# Patient Record
Sex: FEMALE | Race: ASIAN | NOT HISPANIC OR LATINO | Employment: FULL TIME | URBAN - METROPOLITAN AREA
[De-identification: names, ages, dates, MRNs, and addresses within clinical notes are randomized per-mention and may not be internally consistent; named-entity substitution may affect disease eponyms.]

---

## 2019-05-25 ENCOUNTER — AMBULATORY - HEALTHEAST (OUTPATIENT)
Dept: LAB | Facility: CLINIC | Age: 24
End: 2019-05-25

## 2019-05-25 DIAGNOSIS — O00.90 ECTOPIC PREGNANCY: ICD-10-CM

## 2019-05-25 LAB — HCG SERPL-ACNC: 380 MLU/ML (ref 0–4)

## 2019-05-27 ENCOUNTER — AMBULATORY - HEALTHEAST (OUTPATIENT)
Dept: LAB | Facility: CLINIC | Age: 24
End: 2019-05-27

## 2019-05-27 DIAGNOSIS — O00.90 ECTOPIC PREGNANCY: ICD-10-CM

## 2019-05-30 ENCOUNTER — RECORDS - HEALTHEAST (OUTPATIENT)
Dept: ADMINISTRATIVE | Facility: OTHER | Age: 24
End: 2019-05-30

## 2019-06-04 ENCOUNTER — ANESTHESIA - HEALTHEAST (OUTPATIENT)
Dept: SURGERY | Facility: HOSPITAL | Age: 24
End: 2019-06-04

## 2019-06-04 ASSESSMENT — MIFFLIN-ST. JEOR: SCORE: 1282.46

## 2019-06-05 ENCOUNTER — SURGERY - HEALTHEAST (OUTPATIENT)
Dept: SURGERY | Facility: HOSPITAL | Age: 24
End: 2019-06-05

## 2021-02-26 ENCOUNTER — ANESTHESIA - HEALTHEAST (OUTPATIENT)
Dept: OBGYN | Facility: CLINIC | Age: 26
End: 2021-02-26

## 2021-03-01 ENCOUNTER — COMMUNICATION - HEALTHEAST (OUTPATIENT)
Dept: NURSING | Facility: CLINIC | Age: 26
End: 2021-03-01

## 2021-03-01 ENCOUNTER — AMBULATORY - HEALTHEAST (OUTPATIENT)
Dept: NURSING | Facility: CLINIC | Age: 26
End: 2021-03-01

## 2021-03-01 DIAGNOSIS — U07.1 2019 NOVEL CORONAVIRUS DISEASE (COVID-19): ICD-10-CM

## 2021-05-29 NOTE — ANESTHESIA POSTPROCEDURE EVALUATION
Patient: Bryson Cornelius  LAPAROSCOPY WITH Right Salpingectomy, removal of ectopic preganancy, Dilation and Curettage, DILATION AND CURETTAGE  Anesthesia type: general    Patient location: Phase II Recovery  Last vitals:   Vitals Value Taken Time   /58 6/5/2019  5:00 PM   Temp 36.8  C (98.3  F) 6/5/2019  4:06 PM   Pulse 72 6/5/2019  5:03 PM   Resp 20 6/5/2019  5:00 PM   SpO2 98 % 6/5/2019  5:03 PM   Vitals shown include unvalidated device data.  Post vital signs: stable  Level of consciousness: awake and responds to simple questions  Post-anesthesia pain: pain controlled  Post-anesthesia nausea and vomiting: no  Pulmonary: unassisted, return to baseline  Cardiovascular: stable and blood pressure at baseline  Hydration: adequate  Anesthetic events: no    QCDR Measures:  ASA# 11 - Marisol-op Cardiac Arrest: ASA11B - Patient did NOT experience unanticipated cardiac arrest  ASA# 12 - Marisol-op Mortality Rate: ASA12B - Patient did NOT die  ASA# 13 - PACU Re-Intubation Rate: ASA13B - Patient did NOT require a new airway mgmt  ASA# 10 - Composite Anes Safety: ASA10A - No serious adverse event    Additional Notes:

## 2021-05-29 NOTE — ANESTHESIA PREPROCEDURE EVALUATION
Anesthesia Evaluation      Patient summary reviewed   No history of anesthetic complications     Airway   Mallampati: I  Neck ROM: full   Pulmonary - normal exam   (+) a smoker (former)                         Cardiovascular - negative ROS and normal exam  Exercise tolerance: > or = 4 METS   Neuro/Psych - negative ROS     Endo/Other       Comments: Ectopic pregnancy    GI/Hepatic/Renal - negative ROS           Dental - normal exam                        Anesthesia Plan  Planned anesthetic: general endotracheal and total IV anesthesia  Modified RSI  TIVA  Decadron  Zofran  ASA 2   Induction: intravenous   Anesthetic plan and risks discussed with: patient  Anesthesia plan special considerations: antiemetics,   Post-op plan: routine recovery

## 2021-05-29 NOTE — ANESTHESIA CARE TRANSFER NOTE
Last vitals:   Vitals:    06/05/19 1535   BP: 108/60   Pulse: 84   Resp:    Temp: 37.4  C (99.4  F)   SpO2: 100%     Patient's level of consciousness is drowsy  Spontaneous respirations: yes  Maintains airway independently: yes  Dentition unchanged: yes  Oropharynx: oropharynx clear of all foreign objects    QCDR Measures:  ASA# 20 - Surgical Safety Checklist: WHO surgical safety checklist completed prior to induction    PQRS# 430 - Adult PONV Prevention: 4558F - Pt received => 2 anti-emetic agents (different classes) preop & intraop  ASA# 8 - Peds PONV Prevention: NA - Not pediatric patient, not GA or 2 or more risk factors NOT present  PQRS# 424 - Marisol-op Temp Management: 4559F - At least one body temp DOCUMENTED => 35.5C or 95.9F within required timeframe  PQRS# 426 - PACU Transfer Protocol: - Transfer of care checklist used  ASA# 14 - Acute Post-op Pain: ASA14B - Patient did NOT experience pain >= 7 out of 10

## 2021-06-03 VITALS — BODY MASS INDEX: 27.09 KG/M2 | WEIGHT: 138 LBS | HEIGHT: 60 IN

## 2021-06-15 NOTE — ANESTHESIA PROCEDURE NOTES
Epidural Block    Patient location during procedure: OB  Time Called: 2/26/2021 3:48 PM  Reason for Block:labor epidural  Staffing:  Performing  Anesthesiologist: Nivia Albert MD  Preanesthetic Checklist  Completed: patient identified, risks, benefits, and alternatives discussed, timeout performed, consent obtained, at patient's request, airway assessed, oxygen available, suction available, emergency drugs available and hand hygiene performed  Procedure  Patient position: sitting  Prep: ChloraPrep and site prepped and draped  Patient monitoring: continuous pulse oximetry, heart rate and blood pressure  Approach: midline  Location: L1-L2  Injection technique: MARY saline  Number of Attempts:1  Needle  Needle type: Fabrice   Needle gauge: 18 G     Catheter in Space: 3  Assessment  Sensory level:  No complications

## 2021-06-15 NOTE — ANESTHESIA POSTPROCEDURE EVALUATION
Patient: Bryson Cornelius  * No procedures listed *  Anesthesia type: epidural    Patient location: Labor and Delivery  Last vitals: No vitals data found for the desired time range.    Post vital signs: stable  Level of consciousness: awake and return to baseline  Post-anesthesia pain: pain controlled  Post-anesthesia nausea and vomiting: no  Pulmonary: unassisted, return to baseline  Cardiovascular: stable and blood pressure at baseline  Hydration: adequate  Anesthetic events: no, epidural resolved    QCDR Measures:  ASA# 11 - Marisol-op Cardiac Arrest: ASA11B - Patient did NOT experience unanticipated cardiac arrest  ASA# 12 - Marisol-op Mortality Rate: ASA12B - Patient did NOT die  ASA# 13 - PACU Re-Intubation Rate: NA - No ETT / LMA used for case  ASA# 10 - Composite Anes Safety: ASA10A - No serious adverse event    Additional Notes:

## 2021-06-16 PROBLEM — Z34.90 PREGNANT: Status: ACTIVE | Noted: 2021-02-26

## 2021-06-27 ENCOUNTER — HEALTH MAINTENANCE LETTER (OUTPATIENT)
Age: 26
End: 2021-06-27

## 2021-08-04 ENCOUNTER — APPOINTMENT (OUTPATIENT)
Dept: URGENT CARE | Facility: CLINIC | Age: 26
End: 2021-08-04
Payer: COMMERCIAL

## 2021-10-17 ENCOUNTER — HEALTH MAINTENANCE LETTER (OUTPATIENT)
Age: 26
End: 2021-10-17

## 2021-10-28 LAB
ABO (EXTERNAL): NORMAL
HEMOGLOBIN (EXTERNAL): 13.1 G/DL (ref 11.7–15.5)
HEPATITIS B SURFACE ANTIGEN (EXTERNAL): NONREACTIVE
HIV1+2 AB SERPL QL IA: NONREACTIVE
RH (EXTERNAL): POSITIVE
RUBELLA ANTIBODY IGG (EXTERNAL): POSITIVE
TREPONEMA PALLIDUM ANTIBODY (EXTERNAL): NONREACTIVE

## 2022-02-24 LAB — HEMOGLOBIN (EXTERNAL): 11.7 G/DL (ref 11.7–15.5)

## 2022-04-04 ENCOUNTER — HOSPITAL ENCOUNTER (OUTPATIENT)
Facility: CLINIC | Age: 27
Discharge: HOME | End: 2022-04-04
Attending: ADVANCED PRACTICE MIDWIFE | Admitting: ADVANCED PRACTICE MIDWIFE
Payer: COMMERCIAL

## 2022-04-04 ENCOUNTER — HOSPITAL ENCOUNTER (OUTPATIENT)
Facility: CLINIC | Age: 27
End: 2022-04-04
Admitting: ADVANCED PRACTICE MIDWIFE
Payer: COMMERCIAL

## 2022-04-04 VITALS
RESPIRATION RATE: 16 BRPM | TEMPERATURE: 98.1 F | BODY MASS INDEX: 34.95 KG/M2 | DIASTOLIC BLOOD PRESSURE: 65 MMHG | HEIGHT: 60 IN | SYSTOLIC BLOOD PRESSURE: 110 MMHG | WEIGHT: 178 LBS

## 2022-04-04 PROBLEM — Z36.89 ENCOUNTER FOR TRIAGE IN PREGNANT PATIENT: Status: ACTIVE | Noted: 2022-04-04

## 2022-04-04 PROCEDURE — G0463 HOSPITAL OUTPT CLINIC VISIT: HCPCS

## 2022-04-04 RX ORDER — LACTOBACILLUS RHAMNOSUS GG 10B CELL
1 CAPSULE ORAL 2 TIMES DAILY
Status: ON HOLD | COMMUNITY
End: 2023-10-10

## 2022-04-04 RX ORDER — LIDOCAINE 40 MG/G
CREAM TOPICAL
Status: DISCONTINUED | OUTPATIENT
Start: 2022-04-04 | End: 2022-04-04 | Stop reason: HOSPADM

## 2022-04-04 NOTE — PROVIDER NOTIFICATION
04/04/22 1657   Provider Notification   Provider Name/Title Grey TSE   Method of Notification Phone   Request Evaluate - Remote   Notification Reason Status Update   updated on pt EFM category 1 with no contractions. Discharge pt home in stable condition with pre-term labor precautions

## 2022-04-04 NOTE — PROVIDER NOTIFICATION
04/04/22 1618   Provider Notification   Provider Name/Title Grey TSE   Method of Notification Phone   Request Evaluate - Remote   Notification Reason Patient Arrived;Status Update   pt arrival to unit s/p fall around midnight, category 1 EFM at 21 minutes right now, pt reports no cramping/bleeding/leaking, active fetal movement and just some soreness. States to monitor pt for 45-60minutes from time EFM was applied and call with tracing results.

## 2022-04-04 NOTE — PLAN OF CARE
Pt Arrival to [facility] for s/p fall around midnight. EFM applied at 1556 with active fetal movement heard and reported by pt. Pt states her feet came out from under her as she slipped and she hit her buttock/back area but no sides or abdomen. Pt states no cramping, bleeding, leaking of fluid, or bruising just some soreness. Will continue to monitor and provide support, provider paged for further orders.

## 2022-04-04 NOTE — DISCHARGE INSTRUCTIONS
Premature Labor    Premature labor ( labor) is when symptoms of labor occur before 37 weeks of pregnancy. (This is 3 weeks before your due date.) Premature labor can lead to premature delivery. This means giving birth to your baby early. Babies need at least 37 weeks of pregnancy for all the organs to develop normally. The earlier the delivery, the greater the risks to the baby.  In most cases, the cause of premature labor is unknown. But certain factors may make the problem more likely. These include:    History of premature labor with other pregnancies    Smoking    Alcohol or substance abuse    Low pre-pregnancy weight or weight gain during pregnancy    Short time period between pregnancies    Being pregnant with twins, triplets, or more    History of certain types of surgery on the cervix or uterus    Having a short cervix    Certain infections  There are a number of other risk factors. Ask your healthcare provider to help you understand the risk factors specific to your case. Then find out what you can do to control or reduce them.  Contractions are one of the main signs of premature labor. A contraction is different from cramping. It may feel painful and the belly (abdomen) may get hard. It can last from a few seconds to a few minutes. Some women may feel only a sense of pressure in the belly, thighs, rectum, or vagina. Some may feel only the hardening of the uterus without pain or pressure. Or there may be a constant pain in the lower back, which spreads forward toward the belly.Premature labor is often treated with medicines. A hospital stay may be needed. If labor doesn't progress and you and your baby are both healthy, you may be discharged to continue care at home.  Home care    Ask your provider any questions you have. Be certain you understand how to care for yourself at home. Also follow all recommendations given by your healthcare providers.    Learn the signs of premature labor. Watch for  these signs when you get home.    Limit or restrict activities as advised. This may include stopping certain physical activities and cutting back hours at work.    Avoid doing strenuous work as directed by your provider. Ask family and friends for help with tasks and support at home, if needed.    Don t smoke, drink alcohol, or use other harmful substances.    Take steps to reduce stress.    Report any unusual symptoms to your provider.    Follow-up care  Follow up with your healthcare provider, or as directed. Weekly visits with your provider may be needed.  When to seek medical advice  Call your healthcare provider right away if any of these occur:    Regular or frequent contractions, whether they are painful or not    Pressure in the pelvis    Pressure in the lower belly or mild cramping in your belly with or without diarrhea    Constant low, dull backache    Gush or slow leaking of water from your vagina    Change in vaginal discharge (watery, mucus, or bloody)    Any vaginal bleeding    Decreased movement of your baby  Bernardino last reviewed this educational content on 6/1/2018 2000-2021 [employer]. All rights reserved. This information is not intended as a substitute for professional medical care. Always follow your healthcare professional's instructions.

## 2022-04-22 LAB — GROUP B STREPTOCOCCUS (EXTERNAL): NEGATIVE

## 2022-05-02 ENCOUNTER — LAB (OUTPATIENT)
Dept: LAB | Facility: CLINIC | Age: 27
End: 2022-05-02
Attending: OBSTETRICS & GYNECOLOGY
Payer: COMMERCIAL

## 2022-05-02 DIAGNOSIS — Z01.812 PRE-PROCEDURE LAB EXAM: Primary | ICD-10-CM

## 2022-05-02 DIAGNOSIS — Z01.812 PRE-PROCEDURE LAB EXAM: ICD-10-CM

## 2022-05-02 LAB — SARS-COV-2 RNA RESP QL NAA+PROBE: NEGATIVE

## 2022-05-02 PROCEDURE — U0003 INFECTIOUS AGENT DETECTION BY NUCLEIC ACID (DNA OR RNA); SEVERE ACUTE RESPIRATORY SYNDROME CORONAVIRUS 2 (SARS-COV-2) (CORONAVIRUS DISEASE [COVID-19]), AMPLIFIED PROBE TECHNIQUE, MAKING USE OF HIGH THROUGHPUT TECHNOLOGIES AS DESCRIBED BY CMS-2020-01-R: HCPCS

## 2022-05-02 PROCEDURE — U0005 INFEC AGEN DETEC AMPLI PROBE: HCPCS

## 2022-05-03 ENCOUNTER — MEDICAL CORRESPONDENCE (OUTPATIENT)
Dept: HEALTH INFORMATION MANAGEMENT | Facility: CLINIC | Age: 27
End: 2022-05-03
Payer: COMMERCIAL

## 2022-05-04 ENCOUNTER — HOSPITAL ENCOUNTER (INPATIENT)
Facility: CLINIC | Age: 27
LOS: 1 days | Discharge: HOME | End: 2022-05-05
Attending: REGISTERED NURSE | Admitting: REGISTERED NURSE
Payer: COMMERCIAL

## 2022-05-04 ENCOUNTER — ANESTHESIA (OUTPATIENT)
Dept: OBGYN | Facility: CLINIC | Age: 27
End: 2022-05-04
Payer: COMMERCIAL

## 2022-05-04 ENCOUNTER — ANESTHESIA EVENT (OUTPATIENT)
Dept: OBGYN | Facility: CLINIC | Age: 27
End: 2022-05-04
Payer: COMMERCIAL

## 2022-05-04 PROBLEM — Z34.90 ENCOUNTER FOR INDUCTION OF LABOR: Status: ACTIVE | Noted: 2022-05-04

## 2022-05-04 LAB
ABO/RH(D): NORMAL
ANTIBODY SCREEN: NEGATIVE
BASOPHILS # BLD AUTO: 0 10E3/UL (ref 0–0.2)
BASOPHILS NFR BLD AUTO: 0 %
EOSINOPHIL # BLD AUTO: 0.1 10E3/UL (ref 0–0.7)
EOSINOPHIL NFR BLD AUTO: 1 %
ERYTHROCYTE [DISTWIDTH] IN BLOOD BY AUTOMATED COUNT: 16.1 % (ref 10–15)
HCT VFR BLD AUTO: 38.1 % (ref 35–47)
HGB BLD-MCNC: 11.8 G/DL (ref 11.7–15.7)
IMM GRANULOCYTES # BLD: 0.2 10E3/UL
IMM GRANULOCYTES NFR BLD: 2 %
LYMPHOCYTES # BLD AUTO: 2 10E3/UL (ref 0.8–5.3)
LYMPHOCYTES NFR BLD AUTO: 15 %
MCH RBC QN AUTO: 26.5 PG (ref 26.5–33)
MCHC RBC AUTO-ENTMCNC: 31 G/DL (ref 31.5–36.5)
MCV RBC AUTO: 86 FL (ref 78–100)
MONOCYTES # BLD AUTO: 1 10E3/UL (ref 0–1.3)
MONOCYTES NFR BLD AUTO: 7 %
NEUTROPHILS # BLD AUTO: 10.3 10E3/UL (ref 1.6–8.3)
NEUTROPHILS NFR BLD AUTO: 75 %
NRBC # BLD AUTO: 0 10E3/UL
NRBC BLD AUTO-RTO: 0 /100
PLATELET # BLD AUTO: 239 10E3/UL (ref 150–450)
RBC # BLD AUTO: 4.45 10E6/UL (ref 3.8–5.2)
SPECIMEN EXPIRATION DATE: NORMAL
T PALLIDUM AB SER QL: NONREACTIVE
WBC # BLD AUTO: 13.6 10E3/UL (ref 4–11)

## 2022-05-04 PROCEDURE — 10907ZC DRAINAGE OF AMNIOTIC FLUID, THERAPEUTIC FROM PRODUCTS OF CONCEPTION, VIA NATURAL OR ARTIFICIAL OPENING: ICD-10-PCS | Performed by: REGISTERED NURSE

## 2022-05-04 PROCEDURE — 86780 TREPONEMA PALLIDUM: CPT | Performed by: REGISTERED NURSE

## 2022-05-04 PROCEDURE — 86850 RBC ANTIBODY SCREEN: CPT | Performed by: REGISTERED NURSE

## 2022-05-04 PROCEDURE — 250N000013 HC RX MED GY IP 250 OP 250 PS 637: Performed by: REGISTERED NURSE

## 2022-05-04 PROCEDURE — 36415 COLL VENOUS BLD VENIPUNCTURE: CPT | Performed by: REGISTERED NURSE

## 2022-05-04 PROCEDURE — 00HU33Z INSERTION OF INFUSION DEVICE INTO SPINAL CANAL, PERCUTANEOUS APPROACH: ICD-10-PCS | Performed by: ANESTHESIOLOGY

## 2022-05-04 PROCEDURE — 250N000011 HC RX IP 250 OP 636: Performed by: ANESTHESIOLOGY

## 2022-05-04 PROCEDURE — 258N000003 HC RX IP 258 OP 636: Performed by: REGISTERED NURSE

## 2022-05-04 PROCEDURE — 250N000009 HC RX 250: Performed by: REGISTERED NURSE

## 2022-05-04 PROCEDURE — 3E0R3BZ INTRODUCTION OF ANESTHETIC AGENT INTO SPINAL CANAL, PERCUTANEOUS APPROACH: ICD-10-PCS | Performed by: ANESTHESIOLOGY

## 2022-05-04 PROCEDURE — 120N000001 HC R&B MED SURG/OB

## 2022-05-04 PROCEDURE — 370N000003 HC ANESTHESIA WARD SERVICE

## 2022-05-04 PROCEDURE — 3E033VJ INTRODUCTION OF OTHER HORMONE INTO PERIPHERAL VEIN, PERCUTANEOUS APPROACH: ICD-10-PCS | Performed by: REGISTERED NURSE

## 2022-05-04 PROCEDURE — 85014 HEMATOCRIT: CPT | Performed by: REGISTERED NURSE

## 2022-05-04 PROCEDURE — 86901 BLOOD TYPING SEROLOGIC RH(D): CPT | Performed by: REGISTERED NURSE

## 2022-05-04 PROCEDURE — 722N000001 HC LABOR CARE VAGINAL DELIVERY SINGLE

## 2022-05-04 PROCEDURE — 250N000009 HC RX 250: Performed by: ANESTHESIOLOGY

## 2022-05-04 RX ORDER — FENTANYL CITRATE 50 UG/ML
100 INJECTION, SOLUTION INTRAMUSCULAR; INTRAVENOUS
Status: DISCONTINUED | OUTPATIENT
Start: 2022-05-04 | End: 2022-05-04 | Stop reason: HOSPADM

## 2022-05-04 RX ORDER — OXYCODONE HYDROCHLORIDE 5 MG/1
5 TABLET ORAL EVERY 4 HOURS PRN
Status: DISCONTINUED | OUTPATIENT
Start: 2022-05-04 | End: 2022-05-06 | Stop reason: HOSPADM

## 2022-05-04 RX ORDER — MISOPROSTOL 200 UG/1
400 TABLET ORAL
Status: DISCONTINUED | OUTPATIENT
Start: 2022-05-04 | End: 2022-05-06 | Stop reason: HOSPADM

## 2022-05-04 RX ORDER — BUPIVACAINE HYDROCHLORIDE 2.5 MG/ML
INJECTION, SOLUTION EPIDURAL; INFILTRATION; INTRACAUDAL PRN
Status: DISCONTINUED | OUTPATIENT
Start: 2022-05-04 | End: 2022-05-04

## 2022-05-04 RX ORDER — LIDOCAINE HYDROCHLORIDE AND EPINEPHRINE 15; 5 MG/ML; UG/ML
INJECTION, SOLUTION EPIDURAL PRN
Status: DISCONTINUED | OUTPATIENT
Start: 2022-05-04 | End: 2022-05-04

## 2022-05-04 RX ORDER — METOCLOPRAMIDE HYDROCHLORIDE 5 MG/ML
10 INJECTION INTRAMUSCULAR; INTRAVENOUS EVERY 6 HOURS PRN
Status: DISCONTINUED | OUTPATIENT
Start: 2022-05-04 | End: 2022-05-04 | Stop reason: HOSPADM

## 2022-05-04 RX ORDER — EPHEDRINE SULFATE 50 MG/ML
5 INJECTION, SOLUTION INTRAMUSCULAR; INTRAVENOUS; SUBCUTANEOUS
Status: DISCONTINUED | OUTPATIENT
Start: 2022-05-04 | End: 2022-05-04 | Stop reason: HOSPADM

## 2022-05-04 RX ORDER — OXYTOCIN/0.9 % SODIUM CHLORIDE 30/500 ML
340 PLASTIC BAG, INJECTION (ML) INTRAVENOUS CONTINUOUS PRN
Status: DISCONTINUED | OUTPATIENT
Start: 2022-05-04 | End: 2022-05-06 | Stop reason: HOSPADM

## 2022-05-04 RX ORDER — MISOPROSTOL 200 UG/1
400 TABLET ORAL
Status: DISCONTINUED | OUTPATIENT
Start: 2022-05-04 | End: 2022-05-04 | Stop reason: HOSPADM

## 2022-05-04 RX ORDER — CITRIC ACID/SODIUM CITRATE 334-500MG
30 SOLUTION, ORAL ORAL
Status: DISCONTINUED | OUTPATIENT
Start: 2022-05-04 | End: 2022-05-04 | Stop reason: HOSPADM

## 2022-05-04 RX ORDER — NALOXONE HYDROCHLORIDE 0.4 MG/ML
0.2 INJECTION, SOLUTION INTRAMUSCULAR; INTRAVENOUS; SUBCUTANEOUS
Status: DISCONTINUED | OUTPATIENT
Start: 2022-05-04 | End: 2022-05-06 | Stop reason: HOSPADM

## 2022-05-04 RX ORDER — MISOPROSTOL 200 UG/1
800 TABLET ORAL
Status: DISCONTINUED | OUTPATIENT
Start: 2022-05-04 | End: 2022-05-06 | Stop reason: HOSPADM

## 2022-05-04 RX ORDER — IBUPROFEN 800 MG/1
800 TABLET, FILM COATED ORAL EVERY 6 HOURS PRN
Status: DISCONTINUED | OUTPATIENT
Start: 2022-05-04 | End: 2022-05-06 | Stop reason: HOSPADM

## 2022-05-04 RX ORDER — ONDANSETRON 4 MG/1
4 TABLET, ORALLY DISINTEGRATING ORAL EVERY 6 HOURS PRN
Status: DISCONTINUED | OUTPATIENT
Start: 2022-05-04 | End: 2022-05-04 | Stop reason: HOSPADM

## 2022-05-04 RX ORDER — TERBUTALINE SULFATE 1 MG/ML
0.25 INJECTION, SOLUTION SUBCUTANEOUS
Status: DISCONTINUED | OUTPATIENT
Start: 2022-05-04 | End: 2022-05-04 | Stop reason: HOSPADM

## 2022-05-04 RX ORDER — OXYTOCIN 10 [USP'U]/ML
10 INJECTION, SOLUTION INTRAMUSCULAR; INTRAVENOUS
Status: DISCONTINUED | OUTPATIENT
Start: 2022-05-04 | End: 2022-05-04 | Stop reason: HOSPADM

## 2022-05-04 RX ORDER — METOCLOPRAMIDE 10 MG/1
10 TABLET ORAL EVERY 6 HOURS PRN
Status: DISCONTINUED | OUTPATIENT
Start: 2022-05-04 | End: 2022-05-04 | Stop reason: HOSPADM

## 2022-05-04 RX ORDER — ACETAMINOPHEN 325 MG/1
650 TABLET ORAL EVERY 4 HOURS PRN
Status: DISCONTINUED | OUTPATIENT
Start: 2022-05-04 | End: 2022-05-06 | Stop reason: HOSPADM

## 2022-05-04 RX ORDER — CARBOPROST TROMETHAMINE 250 UG/ML
250 INJECTION, SOLUTION INTRAMUSCULAR
Status: DISCONTINUED | OUTPATIENT
Start: 2022-05-04 | End: 2022-05-06 | Stop reason: HOSPADM

## 2022-05-04 RX ORDER — HYDROCORTISONE 2.5 %
CREAM (GRAM) TOPICAL 3 TIMES DAILY PRN
Status: DISCONTINUED | OUTPATIENT
Start: 2022-05-04 | End: 2022-05-06 | Stop reason: HOSPADM

## 2022-05-04 RX ORDER — OXYTOCIN/0.9 % SODIUM CHLORIDE 30/500 ML
340 PLASTIC BAG, INJECTION (ML) INTRAVENOUS CONTINUOUS PRN
Status: DISCONTINUED | OUTPATIENT
Start: 2022-05-04 | End: 2022-05-04 | Stop reason: HOSPADM

## 2022-05-04 RX ORDER — NALOXONE HYDROCHLORIDE 0.4 MG/ML
0.4 INJECTION, SOLUTION INTRAMUSCULAR; INTRAVENOUS; SUBCUTANEOUS
Status: DISCONTINUED | OUTPATIENT
Start: 2022-05-04 | End: 2022-05-06 | Stop reason: HOSPADM

## 2022-05-04 RX ORDER — MODIFIED LANOLIN
OINTMENT (GRAM) TOPICAL
Status: DISCONTINUED | OUTPATIENT
Start: 2022-05-04 | End: 2022-05-06 | Stop reason: HOSPADM

## 2022-05-04 RX ORDER — PROCHLORPERAZINE MALEATE 10 MG
10 TABLET ORAL EVERY 6 HOURS PRN
Status: DISCONTINUED | OUTPATIENT
Start: 2022-05-04 | End: 2022-05-04 | Stop reason: HOSPADM

## 2022-05-04 RX ORDER — OXYTOCIN 10 [USP'U]/ML
10 INJECTION, SOLUTION INTRAMUSCULAR; INTRAVENOUS
Status: DISCONTINUED | OUTPATIENT
Start: 2022-05-04 | End: 2022-05-06 | Stop reason: HOSPADM

## 2022-05-04 RX ORDER — MISOPROSTOL 200 UG/1
800 TABLET ORAL
Status: DISCONTINUED | OUTPATIENT
Start: 2022-05-04 | End: 2022-05-04 | Stop reason: HOSPADM

## 2022-05-04 RX ORDER — ONDANSETRON 2 MG/ML
4 INJECTION INTRAMUSCULAR; INTRAVENOUS EVERY 6 HOURS PRN
Status: DISCONTINUED | OUTPATIENT
Start: 2022-05-04 | End: 2022-05-04 | Stop reason: HOSPADM

## 2022-05-04 RX ORDER — LIDOCAINE 40 MG/G
CREAM TOPICAL
Status: DISCONTINUED | OUTPATIENT
Start: 2022-05-04 | End: 2022-05-04 | Stop reason: HOSPADM

## 2022-05-04 RX ORDER — NALBUPHINE HYDROCHLORIDE 10 MG/ML
2.5-5 INJECTION, SOLUTION INTRAMUSCULAR; INTRAVENOUS; SUBCUTANEOUS EVERY 6 HOURS PRN
Status: DISCONTINUED | OUTPATIENT
Start: 2022-05-04 | End: 2022-05-06 | Stop reason: HOSPADM

## 2022-05-04 RX ORDER — METHYLERGONOVINE MALEATE 0.2 MG/ML
200 INJECTION INTRAVENOUS
Status: DISCONTINUED | OUTPATIENT
Start: 2022-05-04 | End: 2022-05-06 | Stop reason: HOSPADM

## 2022-05-04 RX ORDER — SODIUM CHLORIDE, SODIUM LACTATE, POTASSIUM CHLORIDE, CALCIUM CHLORIDE 600; 310; 30; 20 MG/100ML; MG/100ML; MG/100ML; MG/100ML
INJECTION, SOLUTION INTRAVENOUS CONTINUOUS PRN
Status: DISCONTINUED | OUTPATIENT
Start: 2022-05-04 | End: 2022-05-04 | Stop reason: HOSPADM

## 2022-05-04 RX ORDER — NALOXONE HYDROCHLORIDE 0.4 MG/ML
0.2 INJECTION, SOLUTION INTRAMUSCULAR; INTRAVENOUS; SUBCUTANEOUS
Status: DISCONTINUED | OUTPATIENT
Start: 2022-05-04 | End: 2022-05-04 | Stop reason: HOSPADM

## 2022-05-04 RX ORDER — OXYTOCIN/0.9 % SODIUM CHLORIDE 30/500 ML
100-340 PLASTIC BAG, INJECTION (ML) INTRAVENOUS CONTINUOUS PRN
Status: DISCONTINUED | OUTPATIENT
Start: 2022-05-04 | End: 2022-05-06 | Stop reason: HOSPADM

## 2022-05-04 RX ORDER — BISACODYL 10 MG
10 SUPPOSITORY, RECTAL RECTAL DAILY PRN
Status: DISCONTINUED | OUTPATIENT
Start: 2022-05-04 | End: 2022-05-06 | Stop reason: HOSPADM

## 2022-05-04 RX ORDER — CARBOPROST TROMETHAMINE 250 UG/ML
250 INJECTION, SOLUTION INTRAMUSCULAR
Status: DISCONTINUED | OUTPATIENT
Start: 2022-05-04 | End: 2022-05-04 | Stop reason: HOSPADM

## 2022-05-04 RX ORDER — OXYTOCIN/0.9 % SODIUM CHLORIDE 30/500 ML
1-24 PLASTIC BAG, INJECTION (ML) INTRAVENOUS CONTINUOUS
Status: DISCONTINUED | OUTPATIENT
Start: 2022-05-04 | End: 2022-05-04 | Stop reason: HOSPADM

## 2022-05-04 RX ORDER — PROCHLORPERAZINE 25 MG
25 SUPPOSITORY, RECTAL RECTAL EVERY 12 HOURS PRN
Status: DISCONTINUED | OUTPATIENT
Start: 2022-05-04 | End: 2022-05-04 | Stop reason: HOSPADM

## 2022-05-04 RX ORDER — DOCUSATE SODIUM 100 MG/1
100 CAPSULE, LIQUID FILLED ORAL DAILY
Status: DISCONTINUED | OUTPATIENT
Start: 2022-05-04 | End: 2022-05-06 | Stop reason: HOSPADM

## 2022-05-04 RX ORDER — METHYLERGONOVINE MALEATE 0.2 MG/ML
200 INJECTION INTRAVENOUS
Status: DISCONTINUED | OUTPATIENT
Start: 2022-05-04 | End: 2022-05-04 | Stop reason: HOSPADM

## 2022-05-04 RX ORDER — IBUPROFEN 800 MG/1
800 TABLET, FILM COATED ORAL
Status: DISCONTINUED | OUTPATIENT
Start: 2022-05-04 | End: 2022-05-06 | Stop reason: HOSPADM

## 2022-05-04 RX ORDER — NALOXONE HYDROCHLORIDE 0.4 MG/ML
0.4 INJECTION, SOLUTION INTRAMUSCULAR; INTRAVENOUS; SUBCUTANEOUS
Status: DISCONTINUED | OUTPATIENT
Start: 2022-05-04 | End: 2022-05-04 | Stop reason: HOSPADM

## 2022-05-04 RX ORDER — KETOROLAC TROMETHAMINE 30 MG/ML
30 INJECTION, SOLUTION INTRAMUSCULAR; INTRAVENOUS
Status: DISCONTINUED | OUTPATIENT
Start: 2022-05-04 | End: 2022-05-06 | Stop reason: HOSPADM

## 2022-05-04 RX ADMIN — Medication 2 MILLI-UNITS/MIN: at 09:00

## 2022-05-04 RX ADMIN — LIDOCAINE HYDROCHLORIDE,EPINEPHRINE BITARTRATE 3 ML: 15; .005 INJECTION, SOLUTION EPIDURAL; INFILTRATION; INTRACAUDAL; PERINEURAL at 14:57

## 2022-05-04 RX ADMIN — BUPIVACAINE HYDROCHLORIDE 5 ML: 2.5 INJECTION, SOLUTION EPIDURAL; INFILTRATION; INTRACAUDAL at 14:59

## 2022-05-04 RX ADMIN — IBUPROFEN 800 MG: 800 TABLET ORAL at 18:58

## 2022-05-04 RX ADMIN — DOCUSATE SODIUM 100 MG: 100 CAPSULE, LIQUID FILLED ORAL at 20:27

## 2022-05-04 RX ADMIN — Medication 340 ML/HR: at 15:42

## 2022-05-04 RX ADMIN — SODIUM CHLORIDE, POTASSIUM CHLORIDE, SODIUM LACTATE AND CALCIUM CHLORIDE 1000 ML: 600; 310; 30; 20 INJECTION, SOLUTION INTRAVENOUS at 14:15

## 2022-05-04 RX ADMIN — Medication: at 15:02

## 2022-05-04 RX ADMIN — SODIUM CHLORIDE, POTASSIUM CHLORIDE, SODIUM LACTATE AND CALCIUM CHLORIDE: 600; 310; 30; 20 INJECTION, SOLUTION INTRAVENOUS at 08:57

## 2022-05-04 ASSESSMENT — ACTIVITIES OF DAILY LIVING (ADL)
CHANGE_IN_FUNCTIONAL_STATUS_SINCE_ONSET_OF_CURRENT_ILLNESS/INJURY: NO
WALKING_OR_CLIMBING_STAIRS_DIFFICULTY: NO
CONCENTRATING,_REMEMBERING_OR_MAKING_DECISIONS_DIFFICULTY: NO
DIFFICULTY_EATING/SWALLOWING: NO
DIFFICULTY_COMMUNICATING: NO
WEAR_GLASSES_OR_BLIND: YES
DRESSING/BATHING_DIFFICULTY: NO
TOILETING_ISSUES: NO
HEARING_DIFFICULTY_OR_DEAF: NO
DOING_ERRANDS_INDEPENDENTLY_DIFFICULTY: NO
FALL_HISTORY_WITHIN_LAST_SIX_MONTHS: NO

## 2022-05-04 NOTE — PLAN OF CARE
Problem: Bleeding (Postpartum Vaginal Delivery)  Goal: Hemostasis  Outcome: Ongoing, Progressing     Problem: Pain (Postpartum Vaginal Delivery)  Goal: Acceptable Pain Control  Outcome: Ongoing, Progressing     VSS.  @ 1537. QBL = 50. Perineum intact. Fundus firm during recovery.

## 2022-05-04 NOTE — PROGRESS NOTES
LABOR PROGRESS NOTE  IUP at 38w1d     SUBJECTIVE:  Pain mild, controlled with non pharmacologic . Discussed R/B/A to amniotomy. She and Rajput consent to this. Preformed with return of moderate amount of clear fluid.     OBJECTIVE:  /71   Pulse 88   Temp 97.9  F (36.6  C) (Oral)   Resp 16   SpO2 94%   FHT:  Category 1, baseline 140, moderate, accelerations present, decelerations absent  CONTRACTIONS:  regular, every 2-4 minutes  CERVIX: 3-4/60/-2  FLUID:  AROM, clear    ASSESSMENT:  Induction of labor  AROM  Pitocin augmentation    PLAN:   pain control as desired  Continue pitocin augmentation. Turn down by half following AROM.  Anticipate progress and     Anika Barnett CNM

## 2022-05-04 NOTE — ANESTHESIA PROCEDURE NOTES
Epidural catheter Procedure Note    Pre-Procedure   Staff -        Anesthesiologist:  Monika Samaniego MD       Performed By: anesthesiologist       Location: OB       Procedure Start/Stop Times: 5/4/2022 2:41 PM and 5/4/2022 3:02 PM       Pre-Anesthestic Checklist: patient identified, IV checked, risks and benefits discussed, informed consent, monitors and equipment checked, pre-op evaluation, at physician/surgeon's request and post-op pain management  Timeout:       Correct Patient: Yes        Correct Procedure: Yes        Correct Site: Yes        Correct Position: Yes   Procedure Documentation  Procedure: epidural catheter       Patient Position: sitting       Skin prep: Chloraprep       Insertion Site: L2-3. (midline approach).       Technique: LORT saline        Needle Gauge: 20.        Needle Length (Inches): 3.5           Catheter threaded easily.             # of attempts: 2 and  # of redirects:     Assessment/Narrative         Paresthesias: No.       Test dose of 3 mL lidocaine 1.5% w/ 1:200,000 epinephrine at.         Test dose negative, 3 minutes after injection, for signs of intravascular, subdural, or intrathecal injection.       Insertion/Infusion Method: LORT saline       Aspiration negative for Heme or CSF via Epidural Catheter.    Medication(s) Administered   Medication Administration Time: 5/4/2022 2:41 PM     Comments:  First attempt with easy MARY at L3-4. Catheter threaded easily but aspiration showed heme.  Despite adjusting catheter and flushing with saline, aspiration still showed heme.  Catheter was withdrawn with tip intact.  Second attempt at L2-3 with easy MARY and easy cathter threading.  Negative aspiration, negative test dose. Patient denied ringing in her ears, metallic taste in her mouth, and HR remained stable.  Catheter aspirated again prior to bolus dose.  Patient tolerated the procedure well.

## 2022-05-04 NOTE — PROGRESS NOTES
CONSULT NOTE    I was called to ask to stand by for possible delivery.  Patient is a  @ 38w1d who was complete and pushing with a CAT1 tracing  Anika Barnett arrived and assumed care.    Total time 10min    Blanca Sousa, DO

## 2022-05-04 NOTE — PLAN OF CARE
Problem: Change in Fetal Wellbeing (Labor)  Goal: Stable Fetal Wellbeing  Outcome: Ongoing, Progressing     Problem: Delayed Labor Progression (Labor)  Goal: Effective Progression to Delivery  Outcome: Ongoing, Progressing     VSS. Pt ambulated to room 262 for labor induction. SVE: 3-4/60%/-2. Pitocin started at 0900. Category I tracing, accelerations noted. Markus occasionally, pt appears comfortable. FOB present at bedside.

## 2022-05-04 NOTE — ANESTHESIA PREPROCEDURE EVALUATION
Anesthesia Pre-Procedure Evaluation    Patient: Bryson Cornelius   MRN: [id number] : 1995        Procedure :           Past Medical History:   Diagnosis Date     Miscarriage 05/15/2019      Past Surgical History:   Procedure Laterality Date     HC DILATION/CURETTAGE DIAG/THER NON OB N/A 2019    Procedure: DILATION AND CURETTAGE;  Surgeon: Linda Morrow MD;  Location: [facility] [place];  Service: Gynecology     NO PAST SURGERIES       NM LAP,DIAGNOSTIC ABDOMEN N/A 2019    Procedure: LAPAROSCOPY WITH Right Salpingectomy, removal of ectopic preganancy;  Surgeon: Linda Morrow MD;  Location: [facility] [place];  Service: Gynecology      No Known Allergies   Social History     Tobacco Use     Smoking status: Former Smoker     Quit date: 2019     Years since quitting: 3.0     Smokeless tobacco: Never Used   Substance Use Topics     Alcohol use: Not Currently      Wt Readings from Last 1 Encounters:   22 80.7 kg (178 lb)        Anesthesia Evaluation            ROS/MED HX  ENT/Pulmonary:  - neg pulmonary ROS     Neurologic:       Cardiovascular:  - neg cardiovascular ROS     METS/Exercise Tolerance:     Hematologic:  - neg hematologic  ROS     Musculoskeletal:       GI/Hepatic:       Renal/Genitourinary:       Endo:       Psychiatric/Substance Use:       Infectious Disease:       Malignancy:       Other:               OUTSIDE LABS:  CBC:   Lab Results   Component Value Date    WBC 13.6 (H) 2022    HGB 11.8 2022    HGB 10.0 (L) 2021    HCT 38.1 2022     2022     BMP: No results found for: NA, POTASSIUM, CHLORIDE, CO2, BUN, CR, GLC  COAGS: No results found for: PTT, INR, FIBR  POC: No results found for: BGM, HCG, HCGS  HEPATIC: No results found for: ALBUMIN, PROTTOTAL, ALT, AST, GGT, ALKPHOS, BILITOTAL, BILIDIRECT, SOHAIL  OTHER: No results found for: PH, LACT, A1C, ELLE, PHOS, MAG, LIPASE, AMYLASE, TSH, T4, T3, CRP, SED    Anesthesia Plan    ASA  Status:  2      Anesthesia Type: Epidural.              Consents    Anesthesia Plan(s) and associated risks, benefits, and realistic alternatives discussed. Questions answered and patient/representative(s) expressed understanding.    - Discussed:     - Discussed with:  Patient, Spouse         Postoperative Care            Comments:    Other Comments: Patient requests labor epidural. Chart reviewed, including labs. Reviewed options and risks with the patient, including but not limited to: bleeding, infection, damage to tissues under the skin(nerves, muscles, blood vessels), hypotension, headache, and epidural failure. Questions answered, consent signed. Patient agrees to elective labor epidural.             Monika Samaniego MD

## 2022-05-04 NOTE — L&D DELIVERY NOTE
Vaginal Delivery Note    Name: Bryson Cornelius  : 1995  MRN: [id number]    PRE DELIVERY DIAGNOSIS  1) 27 year old  4 Para 1001 at 38w1d      1) BMI 31  2) Resolved JOHN  3) Glucose intolerance, passed 3hr GTT  4) IUGR  9.8%    POST DELIVERY DIAGNOSIS  1) 27 year old  @ 38w1d  2) Delivery of a viable infant weighing [id number]   via     YOB: 2023     Birth Time: 3:37 PM       Augmentation No              Indication:   Induction Yes                      Indication: IUGR    Monitors: External     GBS: Negative    ROM: AROM  Fluid Type: Clear    Labor Analgesia/Anesthesia:Epidural    Cord pH obtained: No  Placenta Date/Time: 2022  3:46 PM   Placenta submitted to Pathology: No    Description of procedure:   27 year old  with PNC w/ MWC and pregnancy complicated by IUGR presented to L&D with plan for induction.  Her hospital course was uncomplicated.  Patient progressed to complete with artificial rupture of membranes and pitocin. Following AROM at 1345 she quickly became uncomfortable and progressed to complete while sitting up for her epidural at 1515. She pushed effectively to a slow controlled crown, the anterior shoulder delivered easily with gentle downward traction paired with maternal effort. Bryson and Regulo welcomed their son, Joseph.   Shoulder Dystocia No  Operative Vaginal Delivery No  Infant spontaneously delivered over an small hemostatic 1st degree degree laceration.   Infant delivered in GAUTAM position.  Nuchal cord No     Placenta spontaneously delivered: 2022  3:46 PM  grossly intact with 3 vessel cord.  Infant:  Live, vigorous infant  was handed to mom.    Delivery was complicated by nothing Interventions included fundal massage and pitocin.    Delivery QBL (mL): 50    Mother and Infant stable.    CARMENCITA Mayen Dr. remained available for consultation and collaboration as needed.   2022 5:28 PM

## 2022-05-04 NOTE — H&P
HISTORY AND PHYSICAL UPDATE ADMISSION EXAM    Name: Bryson Cornelius  YOB: 1995  Medical Record Number: [id number]    History of Present Illness: Bryson Cornelius is a 27 year old female who is 38w1d pregnant and being admitted for induction of labor, indication intrauterine growth retardation. Supported by , Regulo.     Estimated Date of Delivery: May 17, 2022    EGA 38w1d    OB History    Para Term  AB Living   4 1 1 0 0 1   SAB IAB Ectopic Multiple Live Births   0 0 0 0 1      # Outcome Date GA Lbr Hayder/2nd Weight Sex Delivery Anes PTL Lv   4 Current            3 Term 21 40w1d 15:55 / 00:20 2.86 kg (6 lb 4.9 oz) F Vag-Spont Local, EPI N MICHELLE      Name: INGAFEMALE-BRYSON      Apgar1: 8  Apgar5: 9   2             1                  Lab Results   Component Value Date    HGB 10.0 (L) 2021       Prenatal Complications:  1) BMI 31  2) Resolved JOHN  3) Glucose intolerance, passed 3hr GTT  4) IUGR  9.8%    Exam:      /68 (BP Location: Right arm, Patient Position: Semi-Austin's)   Pulse 102   Temp 97.9  F (36.6  C) (Oral)   Resp 16   SpO2 98%     Fetal heart Rate Category 1, baseline 130, accelerations present, decelerations absent  Contractions irregular     HEENT grossly normal  Neck: no lymphadenopathy or thryoidomegaly  Lungs CTAB  Heart RRR  ABD gravid, non-tender  EXT:  No edema, moves freely  Vaginal exam 3-4/60/-2  Membranes: intact    Assessment: induction of labor, indication intrauterine growth retardation  GBS negative    Plan: Admit - see IP orders  Labor induction with Pitocin  Pain medication as desired  Anticipate   Active management of third stage    Prenatal record reviewed.    CARMENCITA Mayen Dr. aware of patient status and remains available for consultation and collaboration as needed.    2022   8:21 AM

## 2022-05-05 VITALS
DIASTOLIC BLOOD PRESSURE: 67 MMHG | SYSTOLIC BLOOD PRESSURE: 110 MMHG | HEART RATE: 73 BPM | RESPIRATION RATE: 18 BRPM | TEMPERATURE: 98.4 F | OXYGEN SATURATION: 97 %

## 2022-05-05 PROCEDURE — 250N000013 HC RX MED GY IP 250 OP 250 PS 637: Performed by: REGISTERED NURSE

## 2022-05-05 RX ORDER — IBUPROFEN 800 MG/1
800 TABLET, FILM COATED ORAL
Status: ON HOLD
Start: 2022-05-05 | End: 2023-10-10

## 2022-05-05 RX ADMIN — IBUPROFEN 800 MG: 800 TABLET ORAL at 01:35

## 2022-05-05 RX ADMIN — DOCUSATE SODIUM 100 MG: 100 CAPSULE, LIQUID FILLED ORAL at 08:07

## 2022-05-05 RX ADMIN — Medication: at 01:36

## 2022-05-05 RX ADMIN — IBUPROFEN 800 MG: 800 TABLET ORAL at 08:07

## 2022-05-05 RX ADMIN — IBUPROFEN 800 MG: 800 TABLET ORAL at 14:52

## 2022-05-05 NOTE — PLAN OF CARE
Pt meets discharge criteria    VSS. Up ad agusto independently. Fundus firm and midline. Scant vaginal bleeding. Pain controlled with ibuprofen. Tolerating regular diet; denies N/V. Voiding adequately. Lactation saw patient to assist with breastfeeding; patient feeling confident to go home.     I reviewed discharge criteria with patient and answered any remaining questions.     Pt was discharged with all belongings

## 2022-05-05 NOTE — DISCHARGE SUMMARY
OB Discharge Summary      Date:  2022    Name:  Bryson Cornelius  :  1995  MRN:  [id number]      Admission Date:  2022  Delivery Date: 22  Gestational Age at Delivery:  38w1d  Discharge Date:  2022    Principal Diagnosis:    Patient Active Problem List   Diagnosis     Pregnant     Encounter for triage in pregnant patient     Encounter for induction of labor       Other Diagnosis:      Conditions complicating Pregnancy:  cholestasis    Procedure(s) Performed:        Indication for Induction:  cholestasis     Condition at Discharge:  stable    Discharge Medications:      Review of your medicines      START taking      Dose / Directions   ibuprofen 800 MG tablet  Commonly known as: ADVIL/MOTRIN  Used for: Single liveborn infant delivered vaginally      Dose: 800 mg  Take 1 tablet (800 mg) by mouth once as needed for moderate pain (For mild to moderate pain.)  Refills: 0        CONTINUE these medicines which have NOT CHANGED      Dose / Directions   lactobacillus rhamnosus (GG) capsule      Dose: 1 capsule  Take 1 capsule by mouth 2 times daily  Refills: 0     PRENATAL PO      Dose: 1 tablet  [PRENATAL VIT CALC,IRON,FOLIC (PRENATAL VITAMIN ORAL)] Take 1 tablet by mouth daily.  Refills: 0             Discharge Plan:    Follow up with CNM:  At 6 weeks   Patient Instructions:      Physical activity: nothing per vagina    Diet:  regular    Medication:  Ibuprofen prn    Other:        Physician/CNM:  Linda Morrow MD    Name:  Bryson Cornelius  :  1995  MRN:  [id number]

## 2022-05-05 NOTE — LACTATION NOTE
This note was copied from a baby's chart.  A consult was done with Ella in regard to feedings.She reported that she thought bay was doing ok at the breast. At this time, baby was on the R breast wrapped snuggly in a sleep sack and cap. Both were removed and it was explained why to parents. With the nipple tipped to the roof of baby's mouth, a deeper latch was achieved. With breast compression, swallows were pointed out. When breast was not compressed, swallows stopped. Ella has a Medela pump for home use. Oupt resources were discussed for lactation support and ECFE. To continue to follow while inpt.

## 2022-05-05 NOTE — PROGRESS NOTES
"Outreach Note for EPIC    Chart reviewed, discharge plan discussed with patient, needs assessed. Patient verbalizes understanding of plan, requests [facility] as ordered, [facility] nurse visit planned for Sat, May 7th, Home Care Intake updated. Patient and , \"Joseph Johns\", phone number is reported as correct in EMR.    Patient states she has good support at home, has baby care essentials, and feels ready to discharge today. Outreach RN will continue to follow and assist if needed with discharge plan. No additional needs identified at this time.        "

## 2022-05-05 NOTE — PLAN OF CARE
Problem: Plan of Care - These are the overarching goals to be used throughout the patient stay.    Goal: Plan of Care Review/Shift Note  Description: The Plan of Care Review/Shift note should be completed every shift.  The Outcome Evaluation is a brief statement about your assessment that the patient is improving, declining, or no change.  This information will be displayed automatically on your shift note.  Outcome: Ongoing, Progressing  Flowsheets (Taken 5/5/2022 1400)  Plan of Care Reviewed With:   patient   spouse     Problem: Bleeding (Labor)  Goal: Hemostasis  Outcome: Met     Problem: Pain (Postpartum Vaginal Delivery)  Goal: Acceptable Pain Control  Outcome: Ongoing, Progressing   Stable postpartum, bonding well with baby, Pain controlled well with Ibuprofen.

## 2022-05-05 NOTE — L&D DELIVERY NOTE
Postpartum Day 1: Vaginal Delivery    Subjective:  The patient feels well. The patient has no emotional concerns. Pain is well controlled with current medications. The patient is ambulating well. She is voiding and passing gas.The amount and color of the lochia is appropriate for the duration of recovery, patient denies clots. The baby is well.    Objective   The patient has a blood pressure which is within the normal range. The uterine fundus is firm. Urinary output is adequate.     Exam:   /62 (BP Location: Right arm)   Pulse 77   Temp 97.6  F (36.4  C) (Oral)   Resp 17   SpO2 97%   Breastfeeding Unknown   General: NAD  Abdomen: soft, NT   Fundus: firm, NT  Ext: no pain     Impression:   Normal postpartum course.     Plan:   - continue current care.  - plan discharge today.    Linda Morrow MD

## 2022-05-06 NOTE — ANESTHESIA POSTPROCEDURE EVALUATION
Patient: Bryson Cornelius    Procedure: * No procedures listed *       Anesthesia Type:  Epidural    Note:  Disposition: Inpatient   Postop Pain Control: Uneventful            Sign Out: Well controlled pain   PONV: No   Neuro/Psych: Uneventful            Sign Out: Acceptable/Baseline neuro status   Airway/Respiratory: Uneventful            Sign Out: Acceptable/Baseline resp. status   CV/Hemodynamics: Uneventful            Sign Out: Acceptable CV status; No obvious hypovolemia; No obvious fluid overload   Other NRE: NONE   DID A NON-ROUTINE EVENT OCCUR? No    Event details/Postop Comments:  Numbness resolved, ambulating and voiding spontaneously, denies headache or backache. No complaints or concerns.               Last vitals:  Vitals Value Taken Time   BP     Temp     Pulse     Resp     SpO2         Electronically Signed By: Dennis Edmondson MD  May 5, 2022  7:33 PM

## 2022-07-11 ENCOUNTER — MEDICAL CORRESPONDENCE (OUTPATIENT)
Dept: HEALTH INFORMATION MANAGEMENT | Facility: CLINIC | Age: 27
End: 2022-07-11

## 2022-07-24 ENCOUNTER — HEALTH MAINTENANCE LETTER (OUTPATIENT)
Age: 27
End: 2022-07-24

## 2022-09-09 ENCOUNTER — MEDICAL CORRESPONDENCE (OUTPATIENT)
Dept: HEALTH INFORMATION MANAGEMENT | Facility: CLINIC | Age: 27
End: 2022-09-09

## 2022-10-02 ENCOUNTER — HEALTH MAINTENANCE LETTER (OUTPATIENT)
Age: 27
End: 2022-10-02

## 2022-11-11 ENCOUNTER — MEDICAL CORRESPONDENCE (OUTPATIENT)
Dept: PEDIATRICS | Facility: CLINIC | Age: 27
End: 2022-11-11

## 2023-04-13 LAB
ABO (EXTERNAL): NORMAL
HEPATITIS B SURFACE ANTIGEN (EXTERNAL): NONREACTIVE
HIV1+2 AB SERPL QL IA: NEGATIVE
RH (EXTERNAL): POSITIVE
RUBELLA ANTIBODY IGG (EXTERNAL): NORMAL
TREPONEMA PALLIDUM ANTIBODY (EXTERNAL): NONREACTIVE

## 2023-08-12 ENCOUNTER — HEALTH MAINTENANCE LETTER (OUTPATIENT)
Age: 28
End: 2023-08-12

## 2023-09-27 LAB — GROUP B STREPTOCOCCUS (EXTERNAL): NEGATIVE

## 2023-10-09 ENCOUNTER — ANESTHESIA (OUTPATIENT)
Dept: OBGYN | Facility: CLINIC | Age: 28
End: 2023-10-09
Payer: COMMERCIAL

## 2023-10-09 ENCOUNTER — HOSPITAL ENCOUNTER (INPATIENT)
Facility: CLINIC | Age: 28
LOS: 1 days | Discharge: HOME | End: 2023-10-10
Attending: ADVANCED PRACTICE MIDWIFE | Admitting: ADVANCED PRACTICE MIDWIFE
Payer: COMMERCIAL

## 2023-10-09 ENCOUNTER — ANESTHESIA EVENT (OUTPATIENT)
Dept: OBGYN | Facility: CLINIC | Age: 28
End: 2023-10-09
Payer: COMMERCIAL

## 2023-10-09 PROBLEM — Z37.9 NORMAL LABOR: Status: ACTIVE | Noted: 2023-10-09

## 2023-10-09 LAB
ABO/RH(D): NORMAL
ANTIBODY SCREEN: NEGATIVE
ERYTHROCYTE [DISTWIDTH] IN BLOOD BY AUTOMATED COUNT: 15.4 % (ref 10–15)
HCT VFR BLD AUTO: 37.4 % (ref 35–47)
HGB BLD-MCNC: 12 G/DL (ref 11.7–15.7)
MCH RBC QN AUTO: 26.5 PG (ref 26.5–33)
MCHC RBC AUTO-ENTMCNC: 32.1 G/DL (ref 31.5–36.5)
MCV RBC AUTO: 83 FL (ref 78–100)
PLATELET # BLD AUTO: 227 10E3/UL (ref 150–450)
RBC # BLD AUTO: 4.52 10E6/UL (ref 3.8–5.2)
SPECIMEN EXPIRATION DATE: NORMAL
T PALLIDUM AB SER QL: NONREACTIVE
WBC # BLD AUTO: 12.2 10E3/UL (ref 4–11)

## 2023-10-09 PROCEDURE — 370N000003 HC ANESTHESIA WARD SERVICE: Performed by: ANESTHESIOLOGY

## 2023-10-09 PROCEDURE — 250N000009 HC RX 250: Performed by: ANESTHESIOLOGY

## 2023-10-09 PROCEDURE — 250N000011 HC RX IP 250 OP 636: Performed by: ADVANCED PRACTICE MIDWIFE

## 2023-10-09 PROCEDURE — 722N000001 HC LABOR CARE VAGINAL DELIVERY SINGLE

## 2023-10-09 PROCEDURE — 120N000001 HC R&B MED SURG/OB

## 2023-10-09 PROCEDURE — 00HU33Z INSERTION OF INFUSION DEVICE INTO SPINAL CANAL, PERCUTANEOUS APPROACH: ICD-10-PCS | Performed by: ANESTHESIOLOGY

## 2023-10-09 PROCEDURE — 10907ZC DRAINAGE OF AMNIOTIC FLUID, THERAPEUTIC FROM PRODUCTS OF CONCEPTION, VIA NATURAL OR ARTIFICIAL OPENING: ICD-10-PCS | Performed by: ADVANCED PRACTICE MIDWIFE

## 2023-10-09 PROCEDURE — 85027 COMPLETE CBC AUTOMATED: CPT | Performed by: ADVANCED PRACTICE MIDWIFE

## 2023-10-09 PROCEDURE — 999N000016 HC STATISTIC ATTENDANCE AT DELIVERY

## 2023-10-09 PROCEDURE — 36415 COLL VENOUS BLD VENIPUNCTURE: CPT | Performed by: ADVANCED PRACTICE MIDWIFE

## 2023-10-09 PROCEDURE — 999N000157 HC STATISTIC RCP TIME EA 10 MIN

## 2023-10-09 PROCEDURE — 86901 BLOOD TYPING SEROLOGIC RH(D): CPT | Performed by: ADVANCED PRACTICE MIDWIFE

## 2023-10-09 PROCEDURE — 250N000011 HC RX IP 250 OP 636: Performed by: ANESTHESIOLOGY

## 2023-10-09 PROCEDURE — 258N000003 HC RX IP 258 OP 636: Performed by: ADVANCED PRACTICE MIDWIFE

## 2023-10-09 PROCEDURE — 3E0R3BZ INTRODUCTION OF ANESTHETIC AGENT INTO SPINAL CANAL, PERCUTANEOUS APPROACH: ICD-10-PCS | Performed by: ANESTHESIOLOGY

## 2023-10-09 PROCEDURE — 86780 TREPONEMA PALLIDUM: CPT | Performed by: ADVANCED PRACTICE MIDWIFE

## 2023-10-09 PROCEDURE — 250N000013 HC RX MED GY IP 250 OP 250 PS 637: Performed by: ADVANCED PRACTICE MIDWIFE

## 2023-10-09 RX ORDER — METOCLOPRAMIDE HYDROCHLORIDE 5 MG/ML
10 INJECTION INTRAMUSCULAR; INTRAVENOUS EVERY 6 HOURS PRN
Status: DISCONTINUED | OUTPATIENT
Start: 2023-10-09 | End: 2023-10-09 | Stop reason: HOSPADM

## 2023-10-09 RX ORDER — METHYLERGONOVINE MALEATE 0.2 MG/ML
200 INJECTION INTRAVENOUS
Status: DISCONTINUED | OUTPATIENT
Start: 2023-10-09 | End: 2023-10-10 | Stop reason: HOSPADM

## 2023-10-09 RX ORDER — FENTANYL/ROPIVACAINE/NS/PF 2MCG/ML-.1
PLASTIC BAG, INJECTION (ML) EPIDURAL
Status: DISCONTINUED | OUTPATIENT
Start: 2023-10-09 | End: 2023-10-09 | Stop reason: HOSPADM

## 2023-10-09 RX ORDER — OXYTOCIN 10 [USP'U]/ML
10 INJECTION, SOLUTION INTRAMUSCULAR; INTRAVENOUS
Status: DISCONTINUED | OUTPATIENT
Start: 2023-10-09 | End: 2023-10-09 | Stop reason: HOSPADM

## 2023-10-09 RX ORDER — IBUPROFEN 800 MG/1
800 TABLET, FILM COATED ORAL EVERY 6 HOURS PRN
Status: DISCONTINUED | OUTPATIENT
Start: 2023-10-09 | End: 2023-10-10 | Stop reason: HOSPADM

## 2023-10-09 RX ORDER — MODIFIED LANOLIN
OINTMENT (GRAM) TOPICAL
Status: DISCONTINUED | OUTPATIENT
Start: 2023-10-09 | End: 2023-10-10 | Stop reason: HOSPADM

## 2023-10-09 RX ORDER — MISOPROSTOL 200 UG/1
400 TABLET ORAL
Status: DISCONTINUED | OUTPATIENT
Start: 2023-10-09 | End: 2023-10-09 | Stop reason: HOSPADM

## 2023-10-09 RX ORDER — FENTANYL CITRATE 50 UG/ML
100 INJECTION, SOLUTION INTRAMUSCULAR; INTRAVENOUS
Status: DISCONTINUED | OUTPATIENT
Start: 2023-10-09 | End: 2023-10-09 | Stop reason: HOSPADM

## 2023-10-09 RX ORDER — LIDOCAINE HCL/EPINEPHRINE/PF 2%-1:200K
VIAL (ML) INJECTION
Status: COMPLETED | OUTPATIENT
Start: 2023-10-09 | End: 2023-10-09

## 2023-10-09 RX ORDER — MISOPROSTOL 200 UG/1
400 TABLET ORAL
Status: DISCONTINUED | OUTPATIENT
Start: 2023-10-09 | End: 2023-10-10 | Stop reason: HOSPADM

## 2023-10-09 RX ORDER — NALOXONE HYDROCHLORIDE 0.4 MG/ML
0.4 INJECTION, SOLUTION INTRAMUSCULAR; INTRAVENOUS; SUBCUTANEOUS
Status: DISCONTINUED | OUTPATIENT
Start: 2023-10-09 | End: 2023-10-09 | Stop reason: HOSPADM

## 2023-10-09 RX ORDER — IBUPROFEN 800 MG/1
800 TABLET, FILM COATED ORAL
Status: DISCONTINUED | OUTPATIENT
Start: 2023-10-09 | End: 2023-10-10 | Stop reason: HOSPADM

## 2023-10-09 RX ORDER — PROCHLORPERAZINE 25 MG
25 SUPPOSITORY, RECTAL RECTAL EVERY 12 HOURS PRN
Status: DISCONTINUED | OUTPATIENT
Start: 2023-10-09 | End: 2023-10-09 | Stop reason: HOSPADM

## 2023-10-09 RX ORDER — FENTANYL CITRATE-0.9 % NACL/PF 10 MCG/ML
100 PLASTIC BAG, INJECTION (ML) INTRAVENOUS EVERY 5 MIN PRN
Status: DISCONTINUED | OUTPATIENT
Start: 2023-10-09 | End: 2023-10-09 | Stop reason: HOSPADM

## 2023-10-09 RX ORDER — OXYTOCIN/0.9 % SODIUM CHLORIDE 30/500 ML
340 PLASTIC BAG, INJECTION (ML) INTRAVENOUS CONTINUOUS PRN
Status: DISCONTINUED | OUTPATIENT
Start: 2023-10-09 | End: 2023-10-09 | Stop reason: HOSPADM

## 2023-10-09 RX ORDER — NALOXONE HYDROCHLORIDE 0.4 MG/ML
0.2 INJECTION, SOLUTION INTRAMUSCULAR; INTRAVENOUS; SUBCUTANEOUS
Status: DISCONTINUED | OUTPATIENT
Start: 2023-10-09 | End: 2023-10-09 | Stop reason: HOSPADM

## 2023-10-09 RX ORDER — MISOPROSTOL 200 UG/1
800 TABLET ORAL
Status: DISCONTINUED | OUTPATIENT
Start: 2023-10-09 | End: 2023-10-09 | Stop reason: HOSPADM

## 2023-10-09 RX ORDER — CARBOPROST TROMETHAMINE 250 UG/ML
250 INJECTION, SOLUTION INTRAMUSCULAR
Status: DISCONTINUED | OUTPATIENT
Start: 2023-10-09 | End: 2023-10-10 | Stop reason: HOSPADM

## 2023-10-09 RX ORDER — KETOROLAC TROMETHAMINE 30 MG/ML
30 INJECTION, SOLUTION INTRAMUSCULAR; INTRAVENOUS
Status: DISCONTINUED | OUTPATIENT
Start: 2023-10-09 | End: 2023-10-10 | Stop reason: HOSPADM

## 2023-10-09 RX ORDER — OXYTOCIN 10 [USP'U]/ML
10 INJECTION, SOLUTION INTRAMUSCULAR; INTRAVENOUS
Status: DISCONTINUED | OUTPATIENT
Start: 2023-10-09 | End: 2023-10-10 | Stop reason: HOSPADM

## 2023-10-09 RX ORDER — OXYTOCIN/0.9 % SODIUM CHLORIDE 30/500 ML
100-340 PLASTIC BAG, INJECTION (ML) INTRAVENOUS CONTINUOUS PRN
Status: DISCONTINUED | OUTPATIENT
Start: 2023-10-09 | End: 2023-10-10 | Stop reason: HOSPADM

## 2023-10-09 RX ORDER — DOCUSATE SODIUM 100 MG/1
100 CAPSULE, LIQUID FILLED ORAL DAILY
Status: DISCONTINUED | OUTPATIENT
Start: 2023-10-09 | End: 2023-10-10 | Stop reason: HOSPADM

## 2023-10-09 RX ORDER — CARBOPROST TROMETHAMINE 250 UG/ML
250 INJECTION, SOLUTION INTRAMUSCULAR
Status: DISCONTINUED | OUTPATIENT
Start: 2023-10-09 | End: 2023-10-09 | Stop reason: HOSPADM

## 2023-10-09 RX ORDER — OXYTOCIN/0.9 % SODIUM CHLORIDE 30/500 ML
340 PLASTIC BAG, INJECTION (ML) INTRAVENOUS CONTINUOUS PRN
Status: DISCONTINUED | OUTPATIENT
Start: 2023-10-09 | End: 2023-10-10 | Stop reason: HOSPADM

## 2023-10-09 RX ORDER — HYDROCORTISONE 25 MG/G
CREAM TOPICAL 3 TIMES DAILY PRN
Status: DISCONTINUED | OUTPATIENT
Start: 2023-10-09 | End: 2023-10-10 | Stop reason: HOSPADM

## 2023-10-09 RX ORDER — LIDOCAINE 40 MG/G
CREAM TOPICAL
Status: DISCONTINUED | OUTPATIENT
Start: 2023-10-09 | End: 2023-10-09 | Stop reason: HOSPADM

## 2023-10-09 RX ORDER — CITRIC ACID/SODIUM CITRATE 334-500MG
30 SOLUTION, ORAL ORAL
Status: DISCONTINUED | OUTPATIENT
Start: 2023-10-09 | End: 2023-10-09 | Stop reason: HOSPADM

## 2023-10-09 RX ORDER — ONDANSETRON 2 MG/ML
4 INJECTION INTRAMUSCULAR; INTRAVENOUS EVERY 6 HOURS PRN
Status: DISCONTINUED | OUTPATIENT
Start: 2023-10-09 | End: 2023-10-09 | Stop reason: HOSPADM

## 2023-10-09 RX ORDER — METHYLERGONOVINE MALEATE 0.2 MG/ML
200 INJECTION INTRAVENOUS
Status: DISCONTINUED | OUTPATIENT
Start: 2023-10-09 | End: 2023-10-09 | Stop reason: HOSPADM

## 2023-10-09 RX ORDER — ACETAMINOPHEN 325 MG/1
650 TABLET ORAL EVERY 4 HOURS PRN
Status: DISCONTINUED | OUTPATIENT
Start: 2023-10-09 | End: 2023-10-10 | Stop reason: HOSPADM

## 2023-10-09 RX ORDER — MISOPROSTOL 200 UG/1
800 TABLET ORAL
Status: DISCONTINUED | OUTPATIENT
Start: 2023-10-09 | End: 2023-10-10 | Stop reason: HOSPADM

## 2023-10-09 RX ORDER — BISACODYL 10 MG
10 SUPPOSITORY, RECTAL RECTAL DAILY PRN
Status: DISCONTINUED | OUTPATIENT
Start: 2023-10-09 | End: 2023-10-10 | Stop reason: HOSPADM

## 2023-10-09 RX ORDER — ONDANSETRON 4 MG/1
4 TABLET, ORALLY DISINTEGRATING ORAL EVERY 6 HOURS PRN
Status: DISCONTINUED | OUTPATIENT
Start: 2023-10-09 | End: 2023-10-09 | Stop reason: HOSPADM

## 2023-10-09 RX ORDER — PROCHLORPERAZINE MALEATE 10 MG
10 TABLET ORAL EVERY 6 HOURS PRN
Status: DISCONTINUED | OUTPATIENT
Start: 2023-10-09 | End: 2023-10-09 | Stop reason: HOSPADM

## 2023-10-09 RX ORDER — NALBUPHINE HYDROCHLORIDE 20 MG/ML
2.5-5 INJECTION, SOLUTION INTRAMUSCULAR; INTRAVENOUS; SUBCUTANEOUS EVERY 6 HOURS PRN
Status: DISCONTINUED | OUTPATIENT
Start: 2023-10-09 | End: 2023-10-10 | Stop reason: HOSPADM

## 2023-10-09 RX ORDER — METOCLOPRAMIDE 10 MG/1
10 TABLET ORAL EVERY 6 HOURS PRN
Status: DISCONTINUED | OUTPATIENT
Start: 2023-10-09 | End: 2023-10-09 | Stop reason: HOSPADM

## 2023-10-09 RX ADMIN — LIDOCAINE HYDROCHLORIDE,EPINEPHRINE BITARTRATE 3 ML: 20; .005 INJECTION, SOLUTION EPIDURAL; INFILTRATION; INTRACAUDAL; PERINEURAL at 10:50

## 2023-10-09 RX ADMIN — ACETAMINOPHEN 650 MG: 325 TABLET ORAL at 22:08

## 2023-10-09 RX ADMIN — LIDOCAINE HYDROCHLORIDE,EPINEPHRINE BITARTRATE 2 ML: 20; .005 INJECTION, SOLUTION EPIDURAL; INFILTRATION; INTRACAUDAL; PERINEURAL at 10:54

## 2023-10-09 RX ADMIN — IBUPROFEN 800 MG: 800 TABLET ORAL at 21:12

## 2023-10-09 RX ADMIN — DOCUSATE SODIUM 100 MG: 100 CAPSULE, LIQUID FILLED ORAL at 14:47

## 2023-10-09 RX ADMIN — SODIUM CHLORIDE, POTASSIUM CHLORIDE, SODIUM LACTATE AND CALCIUM CHLORIDE 1000 ML: 600; 310; 30; 20 INJECTION, SOLUTION INTRAVENOUS at 09:39

## 2023-10-09 RX ADMIN — KETOROLAC TROMETHAMINE 30 MG: 30 INJECTION INTRAMUSCULAR; INTRAVENOUS at 14:48

## 2023-10-09 RX ADMIN — Medication: at 10:47

## 2023-10-09 ASSESSMENT — ACTIVITIES OF DAILY LIVING (ADL)
ADLS_ACUITY_SCORE: 18
ADLS_ACUITY_SCORE: 18
CONCENTRATING,_REMEMBERING_OR_MAKING_DECISIONS_DIFFICULTY: NO
DRESSING/BATHING_DIFFICULTY: NO
DOING_ERRANDS_INDEPENDENTLY_DIFFICULTY: NO
DIFFICULTY_COMMUNICATING: NO
ADLS_ACUITY_SCORE: 18
DIFFICULTY_EATING/SWALLOWING: NO
WALKING_OR_CLIMBING_STAIRS_DIFFICULTY: NO
ADLS_ACUITY_SCORE: 18
FALL_HISTORY_WITHIN_LAST_SIX_MONTHS: NO
HEARING_DIFFICULTY_OR_DEAF: NO
ADLS_ACUITY_SCORE: 18
TOILETING_ISSUES: NO
CHANGE_IN_FUNCTIONAL_STATUS_SINCE_ONSET_OF_CURRENT_ILLNESS/INJURY: NO
ADLS_ACUITY_SCORE: 18
ADLS_ACUITY_SCORE: 31
WEAR_GLASSES_OR_BLIND: NO
ADLS_ACUITY_SCORE: 18

## 2023-10-09 NOTE — ANESTHESIA PROCEDURE NOTES
"Epidural catheter Procedure Note    Pre-Procedure   Staff -        Anesthesiologist:  Hellen Muro MD       Performed By: anesthesiologist       Location: OB       Procedure Start/Stop Times: 10/9/2023 10:40 AM and 10/9/2023 11:00 AM       Pre-Anesthestic Checklist: patient identified, IV checked, risks and benefits discussed, informed consent, monitors and equipment checked, pre-op evaluation, at physician/surgeon's request and post-op pain management  Timeout:       Correct Patient: Yes        Correct Procedure: Yes        Correct Site: Yes        Correct Position: Yes   Procedure Documentation  Procedure: epidural catheter       Patient Position: sitting       Patient Prep/Sterile Barriers: sterile gloves, mask       Skin prep: Chloraprep       Local skin infiltrated with 3 mL of 1% lidocaine.        Insertion Site: L3-4. (midline approach).       Technique: LORT saline        MARY at 6.5 cm.       Needle Type: [employer] needle ([employer])       Needle Gauge: 18.        Needle Length (Inches): 3.5        Catheter: 20 G.          Catheter threaded easily.         5.5 cm epidural space.         Threaded 12 cm at skin.         # of attempts: 1 and  # of redirects:  0    Assessment/Narrative         Paresthesias: No.       Test dose of 3 mL lidocaine 1.5% w/ 1:200,000 epinephrine at.         Test dose negative, 3 minutes after injection, for signs of intravascular, subdural, or intrathecal injection.       Insertion/Infusion Method: LORT saline       No aspiration negative for Heme or CSF via Epidural Catheter.    Medication(s) Administered   2% Lidocaine w/ 1:200K Epi (EPIDURAL) - EPIDURAL   3 mL - 10/9/2023 10:50:00 AM  Medication Administration Time: 10/9/2023 10:40 AM      FOR [facility] ([facility] [place]) ONLY:   Pain Team Contact information: please page the Pain Team [address]. Search \"Pain\". During daytime hours, please page the attending first. At night please page the resident first.      "

## 2023-10-09 NOTE — PROGRESS NOTES
Patient Name:  Bryson Cornelius  :      1995  MRN:      [id number]    Assessment:     at 38w6d  Activelabor  Category 1 FHTs  SROM for mec fluid      Plan:   -Discussed R/B/A of amniotomy. She consents to this. Completed with return of meconium stained fluid  -Routine support & management. Encourage position changes, ambulation as appropriate, rest as desired.  -Anticipate progress and NSVB.   -Reevaluate progress in 2 hours or sooner with a change in status.     Subjective:  Bryson Cornelius is coping well with contractions. Using epidural for pain relief. Good PO fluid intake. Voided prior to epidural placement. Will plan to straight cath prior to onset of pushing. Family supportive at bedside.       Objective:  /73   Pulse 70   Temp 97.6  F (36.4  C) (Oral)   Resp 16   Ht 1.524 m (5')   Wt 85.3 kg (188 lb)   SpO2 100%   BMI 36.72 kg/m      FHR:Baseline: 140 bpm, Variability: Moderate (6 - 25 bpm), Accelerations: present and Decelerations: Absent    Uterine contractions: toco Frequency: Every 3-4 minutes, Duration: 60-80 minutes seconds and Intensity: strong    SVE:0/100    Provider: VIDA Christie CNM      Date:  10/9/2023  Time:  12:08 PM

## 2023-10-09 NOTE — PLAN OF CARE
Problem: Plan of Care - These are the overarching goals to be used throughout the patient stay.    Goal: Optimal Comfort and Wellbeing  Outcome: Progressing  Intervention: Provide Person-Centered Care  Recent Flowsheet Documentation  Taken 10/9/2023 7950 by Glenda Sanchez RN  Trust Relationship/Rapport:   care explained   choices provided   questions answered   questions encouraged   thoughts/feelings acknowledged   Goal Outcome Evaluation:      Plan of Care Reviewed With: patient        Pt's VS are WDL. Bonding well with infant. Up ad agusto, activity encouraged. Due to void once more in the hat. Denies any pain. Uterus is firm w/o massage and to the right of the umbilicus. Educated pt about LATCH interventions. Pt declined lactation consult. Vaginal bleeding is scant. No further concerns as of present. Plan of care ongoing.

## 2023-10-09 NOTE — PROGRESS NOTES
"Data: [facility] presented to [facility]: 10/9/2023  8:41 AM.  Reason for maternal/fetal assessment is uterine contractions. Patient reports contractions starting at 0700 this morning, unsure of how far apart. She states she has not timed them, maybe every 15 minutes when asked to \"guess\". Patient denies leaking of vaginal fluid/rupture of membranes, vaginal bleeding, abdominal pain, pelvic pressure, nausea, vomiting, headache, visual disturbances, epigastric or RUQ pain, significant edema. Patient reports fetal movement is normal. Patient is a Unknown . Prenatal record reviewed. Pregnancy has been uncomplicated. Support person is present.     Fetal HR baseline was  , variability is  . Accelerations:  . Decelerations:  . Uterine assessment is   during contractions and   at rest. Cervical exam deferred. Fetal presentation   per [place]. Membranes: intact.    Vital signs wnl. Patient reports pain and is coping.     Action: Verbal consent for EFM. Triage assessment completed. Patient may meet criteria for early labor discharge.     Response: Patient verbalized understanding of triage assessment. Will contact Cori Celaya with assessment and consideration of early labor discharge vs admission.            "

## 2023-10-09 NOTE — L&D DELIVERY NOTE
"Vaginal Delivery Note    Name: Bryson Cornelius  : 1995  MRN: [id number]    PRE DELIVERY DIAGNOSIS  1) 28 year old  5 Para  at 38w6d      2) Mec stained fluid    POST DELIVERY DIAGNOSIS  1) 28 year old  @ 38w6d  2) Delivery of a viable infantboy \"Pratik\" weight pending  via     YOB: 2024     Birth Time: 12:57 PM       Augmentation Yes              Indication: patient request at 9cm  Induction No                        Monitors: External     GBS: Negative    ROM: AROM  Fluid Type: Meconium    Labor Analgesia/Anesthesia:Fentanyl    Cord pH obtained: No  Placenta Date/Time:    Placenta submitted to Pathology: No    Description of procedure:   28 year old  with PNC w/ MnWC and pregnancy complicated by Varicella non-immune  Hx FGR with second baby  Close interconceptual spacing   presented to L&D with labor contractions.  Her hospital course was uncomplicated.  Patient progressed to complete with artificial rupture of membranes   Shoulder Dystocia No  Operative Vaginal Delivery No  Infant spontaneously delivered over an intact perineum.     Infant delivered in GAUTAM position.  Nuchal cord No     Placenta spontaneously delivered:   grossly intact with 3 vessel cord.  Infant:  Live, Initially stunned infant  was handed to mom.    Delivery was complicated by nothing. Third stage Interventions included fundal massage and pitocin.    Delivery QBL (mL): 100    Mother and Infant stable.    VIDA Christie CNM, Dr. aware of patient status and remains available for consultation and collaboration as needed.   10/9/2023 1:33 PM  "

## 2023-10-09 NOTE — ANESTHESIA PREPROCEDURE EVALUATION
Anesthesia Pre-Procedure Evaluation    Patient: Bryson Cornelius   MRN: [id number] : 1995        Procedure : * No procedures listed *          Past Medical History:   Diagnosis Date    Miscarriage 05/15/2019      Past Surgical History:   Procedure Laterality Date    HC DILATION/CURETTAGE DIAG/THER NON OB N/A 2019    Procedure: DILATION AND CURETTAGE;  Surgeon: Linda Morrow MD;  Location: [facility];  Service: Gynecology    NO PAST SURGERIES      LA LAP,DIAGNOSTIC ABDOMEN N/A 2019    Procedure: LAPAROSCOPY WITH Right Salpingectomy, removal of ectopic preganancy;  Surgeon: Linda Morrow MD;  Location: [facility];  Service: Gynecology      No Known Allergies   Social History     Tobacco Use    Smoking status: Former     Types: Cigarettes     Quit date: 2019     Years since quittin.4    Smokeless tobacco: Never   Substance Use Topics    Alcohol use: Not Currently      Wt Readings from Last 1 Encounters:   10/09/23 85.3 kg (188 lb)        Anesthesia Evaluation   Pt has had prior anesthetic.     No history of anesthetic complications       ROS/MED HX  ENT/Pulmonary:    (-) asthma   Neurologic:       Cardiovascular:    (-) PIH   METS/Exercise Tolerance:     Hematologic:    (-) anemia   Musculoskeletal:   (+)       kyphoscoliosis lower back pain       GI/Hepatic:       Renal/Genitourinary:       Endo:     (+)               Obesity,    (-) Type II DM   Psychiatric/Substance Use:       Infectious Disease:       Malignancy:       Other:     (-) previous        Physical Exam    Airway        Mallampati: II   TM distance: > 3 FB   Neck ROM: full     Respiratory Devices and Support         Dental           Cardiovascular             Pulmonary                   OUTSIDE LABS:  CBC:   Lab Results   Component Value Date    WBC 12.2 (H) 10/09/2023    WBC 13.6 (H) 2022    HGB 12.0 10/09/2023    HGB 11.8 2022    HCT 37.4 10/09/2023    HCT 38.1 2022    PLT  227 10/09/2023     05/04/2022     BMP: No results found for: NA, POTASSIUM, CHLORIDE, CO2, BUN, CR, GLC  COAGS: No results found for: PTT, INR, FIBR  POC: No results found for: BGM, HCG, HCGS  HEPATIC: No results found for: ALBUMIN, PROTTOTAL, ALT, AST, GGT, ALKPHOS, BILITOTAL, BILIDIRECT, SOHAIL  OTHER: No results found for: PH, LACT, A1C, ELLE, PHOS, MAG, LIPASE, AMYLASE, TSH, T4, T3, CRP, SED    Anesthesia Plan    ASA Status:  2       Anesthesia Type: Epidural.              Consents    Anesthesia Plan(s) and associated risks, benefits, and realistic alternatives discussed. Questions answered and patient/representative(s) expressed understanding.     - Discussed:     - Discussed with:  Patient            Postoperative Care            Comments:                Hellen Muro MD

## 2023-10-09 NOTE — ANESTHESIA POSTPROCEDURE EVALUATION
Patient: Bryson Cornelius    Procedure: * No procedures listed *       Anesthesia Type:  No value filed.    Note:  Disposition: Admission   Postop Pain Control: Uneventful            Sign Out: Well controlled pain   PONV: No   Neuro/Psych: Uneventful            Sign Out: Acceptable/Baseline neuro status   Airway/Respiratory: Uneventful            Sign Out: Acceptable/Baseline resp. status   CV/Hemodynamics: Uneventful            Sign Out: Acceptable CV status; No obvious hypovolemia; No obvious fluid overload   Other NRE: NONE   DID A NON-ROUTINE EVENT OCCUR? No           Last vitals:  Vitals:    10/09/23 1430 10/09/23 1445 10/09/23 1500   BP: 115/72 121/80 112/65   Pulse:      Resp:      Temp:      SpO2: 98% 98% 97%       Electronically Signed By: Hellen Muro MD  October 9, 2023  3:48 PM

## 2023-10-09 NOTE — H&P
HISTORY AND PHYSICAL UPDATE ADMISSION EXAM    Name: Bryson Cornelius  YOB: 1995  Medical Record Number: [id number]    History of Present Illness: Bryson Cornelius is a 28 year old female who is 38w6d pregnant and being admitted for active labor management. Patient reports contractions became regular around 7am. Reports normal fetal movement. Denies headache or visual changes. She is presently resting with an epidural and feeling good.    Estimated Date of Delivery: Oct 17, 2023    EGA 38w6d    OB History    Para Term  AB Living   5 2 2 0 0 2   SAB IAB Ectopic Multiple Live Births   0 0 0 0 2      # Outcome Date GA Lbr Hayder/2nd Weight Sex Delivery Anes PTL Lv   5 Current            4 Term 22 38w1d 01:50 / 00:05 2.55 kg (5 lb 10 oz) M Vag-Spont EPI N MICHELLE      Name: INGAMALE-BRYSON      Apgar1: 8  Apgar5: 9   3 Term 21 40w1d 15:55 / 00:20 2.86 kg (6 lb 4.9 oz) F Vag-Spont Local, EPI N MICHELLE      Name: INGAFEMALE-BRYSON      Apgar1: 8  Apgar5: 9   2             1                  Lab Results   Component Value Date    AS Negative 10/09/2023    HGB 12.0 10/09/2023       Prenatal Complications:   Varicella non-immune  Hx FGR with second baby  Close interconceptual spacing.    Exam:      /75   Pulse 70   Temp 97.6  F (36.4  C) (Oral)   Resp 16   Ht 1.524 m (5')   Wt 85.3 kg (188 lb)   SpO2 100%   BMI 36.72 kg/m      Fetal heart Rate Category   Contractions q3-4 lasting 60-80 seconds    HEENT grossly normal  ABD gravid, non-tender  EXT:  mild edema, moves freely  Vaginal exam (most recent) /-1 per RN  Membranes: intact    Assessment: active labor management    Plan: Admit - see IP orders  Pain medication epidural  MD consultant on call dr. Holly/ available prn  Anticipate     Prenatal record reviewed.    VIDA Christie CNM      10/9/2023   11:24 AM

## 2023-10-10 VITALS
HEART RATE: 89 BPM | RESPIRATION RATE: 18 BRPM | TEMPERATURE: 98.4 F | HEIGHT: 60 IN | SYSTOLIC BLOOD PRESSURE: 105 MMHG | WEIGHT: 190 LBS | BODY MASS INDEX: 37.3 KG/M2 | DIASTOLIC BLOOD PRESSURE: 65 MMHG | OXYGEN SATURATION: 98 %

## 2023-10-10 PROBLEM — Z34.90 PREGNANT: Status: RESOLVED | Noted: 2021-02-26 | Resolved: 2023-10-10

## 2023-10-10 PROBLEM — Z37.9 NORMAL LABOR: Status: RESOLVED | Noted: 2023-10-09 | Resolved: 2023-10-10

## 2023-10-10 PROBLEM — Z36.89 ENCOUNTER FOR TRIAGE IN PREGNANT PATIENT: Status: RESOLVED | Noted: 2022-04-04 | Resolved: 2023-10-10

## 2023-10-10 PROBLEM — Z34.90 ENCOUNTER FOR INDUCTION OF LABOR: Status: RESOLVED | Noted: 2022-05-04 | Resolved: 2023-10-10

## 2023-10-10 PROCEDURE — 250N000013 HC RX MED GY IP 250 OP 250 PS 637: Performed by: ADVANCED PRACTICE MIDWIFE

## 2023-10-10 RX ORDER — DOCUSATE SODIUM 100 MG/1
100 CAPSULE, LIQUID FILLED ORAL 2 TIMES DAILY PRN
COMMUNITY
Start: 2023-10-10 | End: 2024-06-11

## 2023-10-10 RX ORDER — OXYCODONE HYDROCHLORIDE 5 MG/1
5 TABLET ORAL EVERY 4 HOURS PRN
Status: DISCONTINUED | OUTPATIENT
Start: 2023-10-10 | End: 2023-10-10 | Stop reason: HOSPADM

## 2023-10-10 RX ORDER — ACETAMINOPHEN 325 MG/1
650 TABLET ORAL EVERY 4 HOURS PRN
COMMUNITY
Start: 2023-10-10 | End: 2024-06-11

## 2023-10-10 RX ORDER — NALOXONE HYDROCHLORIDE 0.4 MG/ML
0.4 INJECTION, SOLUTION INTRAMUSCULAR; INTRAVENOUS; SUBCUTANEOUS
Status: DISCONTINUED | OUTPATIENT
Start: 2023-10-10 | End: 2023-10-10 | Stop reason: HOSPADM

## 2023-10-10 RX ORDER — NALOXONE HYDROCHLORIDE 0.4 MG/ML
0.2 INJECTION, SOLUTION INTRAMUSCULAR; INTRAVENOUS; SUBCUTANEOUS
Status: DISCONTINUED | OUTPATIENT
Start: 2023-10-10 | End: 2023-10-10 | Stop reason: HOSPADM

## 2023-10-10 RX ORDER — IBUPROFEN 800 MG/1
800 TABLET, FILM COATED ORAL EVERY 6 HOURS PRN
COMMUNITY
Start: 2023-10-10 | End: 2024-06-11

## 2023-10-10 RX ADMIN — ACETAMINOPHEN 650 MG: 325 TABLET ORAL at 07:24

## 2023-10-10 RX ADMIN — ACETAMINOPHEN 650 MG: 325 TABLET ORAL at 03:13

## 2023-10-10 RX ADMIN — OXYCODONE HYDROCHLORIDE 5 MG: 5 TABLET ORAL at 12:15

## 2023-10-10 RX ADMIN — DOCUSATE SODIUM 100 MG: 100 CAPSULE, LIQUID FILLED ORAL at 08:02

## 2023-10-10 RX ADMIN — ACETAMINOPHEN 650 MG: 325 TABLET ORAL at 15:24

## 2023-10-10 RX ADMIN — IBUPROFEN 800 MG: 800 TABLET ORAL at 08:02

## 2023-10-10 RX ADMIN — IBUPROFEN 800 MG: 800 TABLET ORAL at 15:24

## 2023-10-10 RX ADMIN — IBUPROFEN 800 MG: 800 TABLET ORAL at 03:13

## 2023-10-10 ASSESSMENT — ACTIVITIES OF DAILY LIVING (ADL)
ADLS_ACUITY_SCORE: 18

## 2023-10-10 NOTE — PROGRESS NOTES
RN went into room found baby sleeping with mom in bed. Woke mom up and educated on safe sleep and baby was put back in bassinet.

## 2023-10-10 NOTE — PLAN OF CARE
"  Problem: Plan of Care - These are the overarching goals to be used throughout the patient stay.    Goal: Patient-Specific Goal (Individualized)  Description: You can add care plan individualizations to a care plan. Examples of Individualization might be:  \"Parent requests to be called daily at 9am for status\", \"I have a hard time hearing out of my right ear\", or \"Do not touch me to wake me up as it startles me\".  Outcome: Progressing     Problem: Postpartum (Vaginal Delivery)  Goal: Optimal Pain Control and Function  Outcome: Progressing     Problem: Postpartum (Vaginal Delivery)  Goal: Effective Urinary Elimination  Outcome: Progressing   Goal Outcome Evaluation:Pt vitals stable overnight. Calm and comfortable. Breast feeding every 2-3 hrs. Rated pain high at a 10, not constant pain, getting tylenol and ibuprofen to help with pain control. Adequate intake and output.                       "

## 2023-10-10 NOTE — PROGRESS NOTES
Vaginal Delivery Postpartum Day 1    Patient Name:  Bryson Cornelius  :      1995  MRN:      [id number]      Assessment:  Normal postpartum course.    Plan:  Continue current care. Desires 24 hour discharge if peds clears baby for discharge.      Subjective:  The patient feels well:  Voiding without difficulty, lochia normal, tolerating normal diet, ambulating without difficulty and passing flatus. Voiding independently without complication. Pain is well controlled with current medications.  The patient has no emotional concerns.  The baby is well and being fed by breast.  Having more cramping than her last delivery.     YOB: 2024   Birth Time: 12:57 PM     Prenatal Complications include:   Varicella non-immune  Hx FGR with second baby  Close interconceptual spacing.       Objective:  /73 (BP Location: Left arm, Patient Position: Semi-Austin's, Cuff Size: Adult Regular)   Pulse 70   Temp 98.1  F (36.7  C) (Oral)   Resp 17   Ht 1.524 m (5')   Wt 85.3 kg (188 lb)   SpO2 99%   Breastfeeding Unknown   BMI 36.72 kg/m    Patient Vitals for the past 24 hrs:   BP Temp Temp src Pulse Resp SpO2 Height Weight   10/10/23 0740 118/73 98.1  F (36.7  C) Oral -- 17 99 % -- --   10/10/23 0319 114/64 98.1  F (36.7  C) Oral -- 14 97 % -- --   10/10/23 0000 134/69 97.7  F (36.5  C) Oral -- 16 96 % -- --   10/09/23 1938 118/68 98.4  F (36.9  C) Oral -- -- 97 % -- --   10/09/23 1535 118/65 -- -- -- -- 94 % -- --   10/09/23 1531 -- -- -- -- -- 96 % -- --   10/09/23 1526 -- -- -- -- -- 96 % -- --   10/09/23 1521 -- -- -- -- -- 97 % -- --   10/09/23 1520 115/56 -- -- -- -- -- -- --   10/09/23 1519 -- -- -- -- -- 91 % -- --   10/09/23 151 -- -- -- -- -- 96 % -- --   10/09/23 1511 -- -- -- -- -- 96 % -- --   10/09/23 150 -- -- -- -- -- 97 % -- --   10/09/23 150 -- -- -- -- -- 94 % -- --   10/09/23 1500 112/65 -- -- -- -- 97 % -- --   10/09/23 1445 121/80 -- -- -- -- 98 % -- --   10/09/23 1430 115/72 -- -- --  -- 98 % -- --   10/09/23 1415 116/69 -- -- -- -- 97 % -- --   10/09/23 1400 120/73 -- -- -- -- 99 % -- --   10/09/23 1345 122/73 -- -- -- -- 100 % -- --   10/09/23 1330 119/67 -- -- -- -- 100 % -- --   10/09/23 1315 114/56 98.2  F (36.8  C) Oral -- -- 100 % -- --   10/09/23 1300 125/71 -- -- -- -- 99 % -- --   10/09/23 1245 125/73 -- -- -- -- 100 % -- --   10/09/23 1230 119/71 -- -- -- -- 99 % -- --   10/09/23 1215 118/72 -- -- -- -- 100 % -- --   10/09/23 1200 124/69 98.2  F (36.8  C) Oral -- -- 93 % -- --   10/09/23 1145 124/69 -- -- -- -- 100 % -- --   10/09/23 1130 123/73 -- -- -- -- 100 % -- --   10/09/23 1115 126/75 -- -- -- -- 100 % -- --   10/09/23 1110 123/73 -- -- -- -- 100 % -- --   10/09/23 1105 120/72 -- -- -- -- 100 % -- --   10/09/23 1100 125/77 -- -- -- -- 100 % -- --   10/09/23 1059 119/76 -- -- -- -- -- -- --   10/09/23 1057 118/77 -- -- -- -- -- -- --   10/09/23 1055 110/72 -- -- -- -- 99 % -- --   10/09/23 1053 132/80 -- -- -- -- -- -- --   10/09/23 0857 127/86 97.6  F (36.4  C) Oral 70 16 -- 1.524 m (5') 85.3 kg (188 lb)       Exam: Patient A&O x 3. No acute distress, breathing unlabored.The amount and color of the lochia is appropriate for the duration of recovery. Nursing notes reviewed.     Lab Results   Component Value Date    AS Negative 10/09/2023    HGB 12.0 10/09/2023       Immunization History   Administered Date(s) Administered    MMR 02/27/2021       Provider:  VIDA Fang CNM    Date:  10/10/2023  Time:  8:03 AM

## 2023-10-10 NOTE — DISCHARGE INSTRUCTIONS
Warning Signs after Having a Baby    Keep this paper on your fridge or somewhere else where you can see it.    Call your provider if you have any of these symptoms up to 12 weeks after having your baby.    Thoughts of hurting yourself or your baby  Pain in your chest or trouble breathing  Severe headache not helped by pain medicine  Eyesight concerns (blurry vision, seeing spots or flashes of light, other changes to eyesight)  Fainting, shaking or other signs of a seizure    Call 9-1-1 if you feel that it is an emergency.     The symptoms below can happen to anyone after giving birth. They can be very serious. Call your provider if you have any of these warning signs.    My provider s phone number: _______________________    Losing too much blood (hemorrhage)    Call your provider if you soak through a pad in less than an hour or pass blood clots bigger than a golf ball. These may be signs that you are bleeding too much.    Blood clots in the legs or lungs    After you give birth, your body naturally clots its blood to help prevent blood loss. Sometimes this increased clotting can happen in other areas of the body, like the legs or lungs. This can block your blood flow and be very dangerous.     Call your provider if you:  Have a red, swollen spot on the back of your leg that is warm or painful when you touch it.   Are coughing up blood.     Infection    Call your provider if you have any of these symptoms:  Fever of 100.4 F (38 C) or higher.  Pain or redness around your stitches if you had an incision.   Any yellow, white, or green fluid coming from places where you had stitches or surgery.    Mood Problems (postpartum depression)    Many people feel sad or have mood changes after having a baby. But for some people, these mood swings are worse.     Call your provider right away if you feel so anxious or nervous that you can't care for yourself or your baby.    Preeclampsia (high blood pressure)    Even if you  didn't have high blood pressure when you were pregnant, you are at risk for the high blood pressure disease called preeclampsia. This risk can last up to 12 weeks after giving birth.     Call your provider if you have:   Pain on your right side under your rib cage  Sudden swelling in the hands and face    Remember: You know your body. If something doesn't feel right, get medical help.     For informational purposes only. Not to replace the advice of your health care provider. Copyright 2020 [facility]. All rights reserved. Clinically reviewed by Katie Farr, RNC-OB, MSN. [employer] 408563 - Rev 02/23.    A Homecare Visit is set up on Thurs, Oct 12th.The RN will call you after 4 p.m. the evening before the visit with a time. Please do not make a clinic visit for the same day as your Homecare Visit. You can contact [facility] at [phone] if you have any further questions related to the home visit.

## 2023-10-10 NOTE — PROGRESS NOTES
"Outreach Note for [facility]    Chart reviewed, discharge plan discussed with patient, needs assessed. Patient verbalizes understanding of plan, requests [facility] visit as ordered, [facility] nurse visit planned for Thurs, Oct 12th, Home Care Intake updated. Patient and , \"Pratik Johns\", phone number is reported as correct in EMR.    Patient states she has good support at home, has baby care essentials, and feels ready to discharge today. Outreach RN will continue to follow and assist if needed with discharge plan. No additional needs identified at this time.      "

## 2023-10-10 NOTE — DISCHARGE SUMMARY
OB Discharge Summary      Date:  10/10/2023    Name:  Bryson Cornelius  :  1995  MRN:  [id number]      Admission Date:  10/9/2023  Delivery Date: 10/9/2023   Gestational Age at Delivery:  38w6d  Discharge Date:  10/10/2023    Principal Diagnosis:    Patient Active Problem List   Diagnosis     (normal spontaneous vaginal delivery)         Conditions complicating Pregnancy:   Varicella non-immune  Hx FGR with second baby  Close interconceptual spacing.       Procedure(s) Performed:      Indication for :  N/A  Indication for Induction:  N/A     Condition at Discharge:  stable    Discharge Medications:      Review of your medicines        START taking        Dose / Directions   acetaminophen 325 MG tablet  Commonly known as: TYLENOL      Dose: 650 mg  Take 2 tablets (650 mg) by mouth every 4 hours as needed for mild pain  Refills: 0     docusate sodium 100 MG capsule  Commonly known as: COLACE      Dose: 100 mg  Take 1 capsule (100 mg) by mouth 2 times daily as needed for constipation  Refills: 0            CONTINUE these medicines which may have CHANGED, or have new prescriptions. If we are uncertain of the size of tablets/capsules you have at home, strength may be listed as something that might have changed.        Dose / Directions   ibuprofen 800 MG tablet  Commonly known as: ADVIL/MOTRIN  This may have changed:   when to take this  reasons to take this      Dose: 800 mg  Take 1 tablet (800 mg) by mouth every 6 hours as needed for other (cramping)  Refills: 0            CONTINUE these medicines which have NOT CHANGED        Dose / Directions   PRENATAL PO      Dose: 1 tablet  [PRENATAL VIT CALC,IRON,FOLIC (PRENATAL VITAMIN ORAL)] Take 1 tablet by mouth daily.  Refills: 0            STOP taking      lactobacillus rhamnosus (GG) capsule                  Where to get your medicines        Some of these will need a paper prescription and others can be bought over the counter. Ask your nurse if you  have questions.    You don't need a prescription for these medications  acetaminophen 325 MG tablet  docusate sodium 100 MG capsule  ibuprofen 800 MG tablet          Discharge Plan:    Follow up with /SLOANEM:  6 weeks postpartum w/ [facility] provider   Patient Instructions:      Physical activity: As tolerated. Nothing in the vagina for 6 weeks.    Diet:  Regular    Medication: As listed above. May alternate ibuprofen and acetaminophen for pain management.    Other:        Physician/CNM: VIDA Fang CNM    Name:  Bryson Cornelius  :  1995  MRN:  [id number]

## 2023-10-10 NOTE — PROGRESS NOTES
Discharge instructions reviewed with pt. Pt verbalized understanding, denies concerns at this time.

## 2023-10-10 NOTE — CONSULTS
Integrative Therapy Consult    Healing PresenceYes  Essential Oils: Topical (EO/Topical Oil)     Mandarin - HC, Lavender Massage Oil - HC       Healing Music:       Breathwork:       Guided Imagery:       Acupressure:       Oshibori:       Energy Therapy:       Healing Touch:       Reiki:       Qi Gong:     Massage: Foot, Targeted massage      Targeted Massage: Legs (MLD-lower legs)  Sleep Promotion:       Other Therapy:       Intervention Reason: Edema     Pre and Post Session Scores: Patient Desires Treatment: yes                             Delivery:         Referrals:      Mary Sandoval

## 2024-06-11 ENCOUNTER — APPOINTMENT (OUTPATIENT)
Dept: RADIOLOGY | Facility: CLINIC | Age: 29
DRG: 872 | End: 2024-06-11
Attending: EMERGENCY MEDICINE
Payer: COMMERCIAL

## 2024-06-11 ENCOUNTER — HOSPITAL ENCOUNTER (INPATIENT)
Facility: CLINIC | Age: 29
LOS: 3 days | Discharge: HOME | DRG: 872 | End: 2024-06-14
Attending: EMERGENCY MEDICINE | Admitting: INTERNAL MEDICINE
Payer: COMMERCIAL

## 2024-06-11 ENCOUNTER — APPOINTMENT (OUTPATIENT)
Dept: CT IMAGING | Facility: CLINIC | Age: 29
DRG: 872 | End: 2024-06-11
Attending: EMERGENCY MEDICINE
Payer: COMMERCIAL

## 2024-06-11 DIAGNOSIS — N10 ACUTE PYELONEPHRITIS: ICD-10-CM

## 2024-06-11 LAB
ALBUMIN UR-MCNC: 10 MG/DL
ANION GAP SERPL CALCULATED.3IONS-SCNC: 14 MMOL/L (ref 7–15)
APPEARANCE UR: CLEAR
BACTERIA #/AREA URNS HPF: ABNORMAL /HPF
BASOPHILS # BLD AUTO: 0 10E3/UL (ref 0–0.2)
BASOPHILS NFR BLD AUTO: 0 %
BILIRUB UR QL STRIP: NEGATIVE
BUN SERPL-MCNC: 8.3 MG/DL (ref 6–20)
CALCIUM SERPL-MCNC: 9.1 MG/DL (ref 8.6–10)
CHLORIDE SERPL-SCNC: 102 MMOL/L (ref 98–107)
COLOR UR AUTO: ABNORMAL
CREAT SERPL-MCNC: 0.65 MG/DL (ref 0.51–0.95)
DEPRECATED HCO3 PLAS-SCNC: 20 MMOL/L (ref 22–29)
EGFRCR SERPLBLD CKD-EPI 2021: >90 ML/MIN/1.73M2
EOSINOPHIL # BLD AUTO: 0 10E3/UL (ref 0–0.7)
EOSINOPHIL NFR BLD AUTO: 0 %
ERYTHROCYTE [DISTWIDTH] IN BLOOD BY AUTOMATED COUNT: 13.3 % (ref 10–15)
FLUAV RNA SPEC QL NAA+PROBE: NEGATIVE
FLUBV RNA RESP QL NAA+PROBE: NEGATIVE
GLUCOSE SERPL-MCNC: 142 MG/DL (ref 70–99)
GLUCOSE UR STRIP-MCNC: NEGATIVE MG/DL
HCG SERPL QL: NEGATIVE
HCT VFR BLD AUTO: 37.9 % (ref 35–47)
HGB BLD-MCNC: 12.5 G/DL (ref 11.7–15.7)
HGB UR QL STRIP: NEGATIVE
IMM GRANULOCYTES # BLD: 0 10E3/UL
IMM GRANULOCYTES NFR BLD: 0 %
KETONES UR STRIP-MCNC: NEGATIVE MG/DL
LACTATE SERPL-SCNC: 1.2 MMOL/L (ref 0.7–2)
LACTATE SERPL-SCNC: 2.5 MMOL/L (ref 0.7–2)
LEUKOCYTE ESTERASE UR QL STRIP: ABNORMAL
LYMPHOCYTES # BLD AUTO: 1.1 10E3/UL (ref 0.8–5.3)
LYMPHOCYTES NFR BLD AUTO: 8 %
MCH RBC QN AUTO: 29.6 PG (ref 26.5–33)
MCHC RBC AUTO-ENTMCNC: 33 G/DL (ref 31.5–36.5)
MCV RBC AUTO: 90 FL (ref 78–100)
MONOCYTES # BLD AUTO: 0.2 10E3/UL (ref 0–1.3)
MONOCYTES NFR BLD AUTO: 1 %
NEUTROPHILS # BLD AUTO: 13.5 10E3/UL (ref 1.6–8.3)
NEUTROPHILS NFR BLD AUTO: 91 %
NITRATE UR QL: NEGATIVE
NRBC # BLD AUTO: 0 10E3/UL
NRBC BLD AUTO-RTO: 0 /100
PH UR STRIP: 6 [PH] (ref 5–7)
PLATELET # BLD AUTO: 231 10E3/UL (ref 150–450)
POTASSIUM SERPL-SCNC: 3.6 MMOL/L (ref 3.4–5.3)
RBC # BLD AUTO: 4.22 10E6/UL (ref 3.8–5.2)
RBC URINE: 1 /HPF
RSV RNA SPEC NAA+PROBE: NEGATIVE
SARS-COV-2 RNA RESP QL NAA+PROBE: NEGATIVE
SODIUM SERPL-SCNC: 136 MMOL/L (ref 135–145)
SP GR UR STRIP: 1 (ref 1–1.03)
SQUAMOUS EPITHELIAL: 7 /HPF
UROBILINOGEN UR STRIP-MCNC: <2 MG/DL
WBC # BLD AUTO: 14.9 10E3/UL (ref 4–11)
WBC URINE: 23 /HPF

## 2024-06-11 PROCEDURE — 87186 SC STD MICRODIL/AGAR DIL: CPT | Performed by: EMERGENCY MEDICINE

## 2024-06-11 PROCEDURE — 99285 EMERGENCY DEPT VISIT HI MDM: CPT | Mod: 25

## 2024-06-11 PROCEDURE — 36415 COLL VENOUS BLD VENIPUNCTURE: CPT | Performed by: EMERGENCY MEDICINE

## 2024-06-11 PROCEDURE — 71046 X-RAY EXAM CHEST 2 VIEWS: CPT

## 2024-06-11 PROCEDURE — 99222 1ST HOSP IP/OBS MODERATE 55: CPT | Performed by: INTERNAL MEDICINE

## 2024-06-11 PROCEDURE — 82374 ASSAY BLOOD CARBON DIOXIDE: CPT | Performed by: EMERGENCY MEDICINE

## 2024-06-11 PROCEDURE — 83605 ASSAY OF LACTIC ACID: CPT | Performed by: EMERGENCY MEDICINE

## 2024-06-11 PROCEDURE — 93005 ELECTROCARDIOGRAM TRACING: CPT | Performed by: EMERGENCY MEDICINE

## 2024-06-11 PROCEDURE — 87149 DNA/RNA DIRECT PROBE: CPT | Performed by: EMERGENCY MEDICINE

## 2024-06-11 PROCEDURE — 250N000013 HC RX MED GY IP 250 OP 250 PS 637: Performed by: EMERGENCY MEDICINE

## 2024-06-11 PROCEDURE — 250N000011 HC RX IP 250 OP 636: Mod: JZ | Performed by: EMERGENCY MEDICINE

## 2024-06-11 PROCEDURE — 87637 SARSCOV2&INF A&B&RSV AMP PRB: CPT | Performed by: EMERGENCY MEDICINE

## 2024-06-11 PROCEDURE — 87086 URINE CULTURE/COLONY COUNT: CPT | Performed by: EMERGENCY MEDICINE

## 2024-06-11 PROCEDURE — 258N000003 HC RX IP 258 OP 636: Mod: JZ | Performed by: INTERNAL MEDICINE

## 2024-06-11 PROCEDURE — 96365 THER/PROPH/DIAG IV INF INIT: CPT

## 2024-06-11 PROCEDURE — 120N000001 HC R&B MED SURG/OB

## 2024-06-11 PROCEDURE — 85041 AUTOMATED RBC COUNT: CPT | Performed by: EMERGENCY MEDICINE

## 2024-06-11 PROCEDURE — 96367 TX/PROPH/DG ADDL SEQ IV INF: CPT

## 2024-06-11 PROCEDURE — 74176 CT ABD & PELVIS W/O CONTRAST: CPT

## 2024-06-11 PROCEDURE — 258N000003 HC RX IP 258 OP 636: Mod: JZ | Performed by: EMERGENCY MEDICINE

## 2024-06-11 PROCEDURE — 96361 HYDRATE IV INFUSION ADD-ON: CPT

## 2024-06-11 PROCEDURE — 96366 THER/PROPH/DIAG IV INF ADDON: CPT

## 2024-06-11 PROCEDURE — 84703 CHORIONIC GONADOTROPIN ASSAY: CPT | Performed by: EMERGENCY MEDICINE

## 2024-06-11 PROCEDURE — 81001 URINALYSIS AUTO W/SCOPE: CPT | Performed by: EMERGENCY MEDICINE

## 2024-06-11 RX ORDER — ACETAMINOPHEN 650 MG/1
650 SUPPOSITORY RECTAL EVERY 4 HOURS PRN
Status: DISCONTINUED | OUTPATIENT
Start: 2024-06-11 | End: 2024-06-14 | Stop reason: HOSPADM

## 2024-06-11 RX ORDER — DROSPIRENONE 4 MG/1
1 TABLET, FILM COATED ORAL DAILY
COMMUNITY
Start: 2024-06-10

## 2024-06-11 RX ORDER — CALCIUM CARBONATE 500 MG/1
1000 TABLET, CHEWABLE ORAL 4 TIMES DAILY PRN
Status: DISCONTINUED | OUTPATIENT
Start: 2024-06-11 | End: 2024-06-14 | Stop reason: HOSPADM

## 2024-06-11 RX ORDER — AMOXICILLIN 250 MG
2 CAPSULE ORAL 2 TIMES DAILY PRN
Status: DISCONTINUED | OUTPATIENT
Start: 2024-06-11 | End: 2024-06-14 | Stop reason: HOSPADM

## 2024-06-11 RX ORDER — ACETAMINOPHEN 325 MG/1
650 TABLET ORAL EVERY 4 HOURS PRN
Status: DISCONTINUED | OUTPATIENT
Start: 2024-06-11 | End: 2024-06-14 | Stop reason: HOSPADM

## 2024-06-11 RX ORDER — PIPERACILLIN SODIUM, TAZOBACTAM SODIUM 3; .375 G/15ML; G/15ML
3.38 INJECTION, POWDER, LYOPHILIZED, FOR SOLUTION INTRAVENOUS ONCE
Status: COMPLETED | OUTPATIENT
Start: 2024-06-11 | End: 2024-06-11

## 2024-06-11 RX ORDER — SODIUM CHLORIDE 9 MG/ML
INJECTION, SOLUTION INTRAVENOUS CONTINUOUS
Status: DISCONTINUED | OUTPATIENT
Start: 2024-06-11 | End: 2024-06-13

## 2024-06-11 RX ORDER — AMOXICILLIN 250 MG
1 CAPSULE ORAL 2 TIMES DAILY PRN
Status: DISCONTINUED | OUTPATIENT
Start: 2024-06-11 | End: 2024-06-14 | Stop reason: HOSPADM

## 2024-06-11 RX ORDER — ACETAMINOPHEN 325 MG/1
650 TABLET ORAL ONCE
Status: COMPLETED | OUTPATIENT
Start: 2024-06-11 | End: 2024-06-11

## 2024-06-11 RX ORDER — IBUPROFEN 200 MG
200-400 TABLET ORAL EVERY 4 HOURS PRN
COMMUNITY

## 2024-06-11 RX ORDER — LIDOCAINE 40 MG/G
CREAM TOPICAL
Status: DISCONTINUED | OUTPATIENT
Start: 2024-06-11 | End: 2024-06-14 | Stop reason: HOSPADM

## 2024-06-11 RX ORDER — PIPERACILLIN SODIUM, TAZOBACTAM SODIUM 3; .375 G/15ML; G/15ML
3.38 INJECTION, POWDER, LYOPHILIZED, FOR SOLUTION INTRAVENOUS EVERY 8 HOURS
Status: DISCONTINUED | OUTPATIENT
Start: 2024-06-12 | End: 2024-06-14 | Stop reason: HOSPADM

## 2024-06-11 RX ADMIN — SODIUM CHLORIDE: 9 INJECTION, SOLUTION INTRAVENOUS at 20:42

## 2024-06-11 RX ADMIN — PIPERACILLIN AND TAZOBACTAM 3.38 G: 3; .375 INJECTION, POWDER, FOR SOLUTION INTRAVENOUS at 16:39

## 2024-06-11 RX ADMIN — SODIUM CHLORIDE 1000 ML: 9 INJECTION, SOLUTION INTRAVENOUS at 16:06

## 2024-06-11 RX ADMIN — ACETAMINOPHEN 650 MG: 325 TABLET ORAL at 16:29

## 2024-06-11 RX ADMIN — VANCOMYCIN HYDROCHLORIDE 1500 MG: 5 INJECTION, POWDER, LYOPHILIZED, FOR SOLUTION INTRAVENOUS at 17:15

## 2024-06-11 ASSESSMENT — ACTIVITIES OF DAILY LIVING (ADL)
ADLS_ACUITY_SCORE: 35

## 2024-06-11 ASSESSMENT — COLUMBIA-SUICIDE SEVERITY RATING SCALE - C-SSRS
2. HAVE YOU ACTUALLY HAD ANY THOUGHTS OF KILLING YOURSELF IN THE PAST MONTH?: NO
1. IN THE PAST MONTH, HAVE YOU WISHED YOU WERE DEAD OR WISHED YOU COULD GO TO SLEEP AND NOT WAKE UP?: NO
6. HAVE YOU EVER DONE ANYTHING, STARTED TO DO ANYTHING, OR PREPARED TO DO ANYTHING TO END YOUR LIFE?: NO

## 2024-06-11 NOTE — MEDICATION SCRIBE - ADMISSION MEDICATION HISTORY
Medication Scribe Admission Medication History    Admission medication history is complete. The information provided in this note is only as accurate as the sources available at the time of the update.    Information Source(s): Patient, Family member, and CareEverywhere/[employer] via in-person    Pertinent Information: Patient took 2 ibuprofen 200 mg tablets in the morning and had 2 more PTA. Ran out of oral contraceptive a few days ago. Pharmacy has filled it on 6/10 per [employer].    Changes made to PTA medication list:  Added:   Drospirenone 4 mg  Deleted:   Acetaminophen 325 mg - not taking  Docusate 100 mg - not taking  Prenatal vitamin - not taking  Changed:   Ibuprofen 800 mg --> 200 mg     Allergies reviewed with patient and updates made in EHR: yes    Medication History Completed By: Vinayak Monzon 6/11/2024 4:32 PM    PTA Med List   Medication Sig Last Dose    ibuprofen (ADVIL/MOTRIN) 200 MG tablet Take 200-400 mg by mouth every 4 hours as needed for pain (do not exceed 3,200 mg of ibuprofen in 24 hours) 6/11/2024 at 1515; 400 mg (800 mg total today)    SLYND 4 MG TABS tablet Take 1 tablet by mouth daily Past Week at few days ago, ran out

## 2024-06-11 NOTE — ED TRIAGE NOTES
Pt c/o fever and chills starting yesterday afternoon. Today endorses SOB and R flank pain. Denies CP. Denies N/V/D. Took tylenol and ibuprofen pta.      Triage Assessment (Adult)       Row Name 06/11/24 1538          Triage Assessment    Airway WDL WDL        Respiratory WDL    Respiratory WDL X;rhythm/pattern     Rhythm/Pattern, Respiratory shortness of breath        Skin Circulation/Temperature WDL    Skin Circulation/Temperature WDL WDL        Cardiac WDL    Cardiac WDL X;rhythm     Pulse Rate & Regularity tachycardic        Peripheral/Neurovascular WDL    Peripheral Neurovascular WDL WDL        Cognitive/Neuro/Behavioral WDL    Cognitive/Neuro/Behavioral WDL WDL

## 2024-06-11 NOTE — LETTER
[address] [address]  Phone: [phone]  Fax: [phone]    June 14, 2024        Bryson Cornelius  [address]  [place] [address]          To whom it may concern:    RE: Bryson Pearsono    Patient was hospitalized between 6/11-6/14.    Please contact me for questions or concerns.      Sincerely,    Evonne Webster MD  [facility]  Phone: [phone]

## 2024-06-11 NOTE — PHARMACY-VANCOMYCIN DOSING SERVICE
Pharmacy Vancomycin Initial Note  Date of Service 2024  Patient's  1995  29 year old, female    Indication: Sepsis    Current estimated CrCl = Estimated Creatinine Clearance: 108.9 mL/min (based on SCr of 0.65 mg/dL).    Creatinine for last 3 days  2024:  4:02 PM Creatinine 0.65 mg/dL    Recent Vancomycin Level(s) for last 3 days  No results found for requested labs within last 3 days.      Vancomycin IV Administrations (past 72 hours)        No vancomycin orders with administrations in past 72 hours.                    Nephrotoxins and other renal medications (From now, onward)      Start     Dose/Rate Route Frequency Ordered Stop    24 1700  vancomycin (VANCOCIN) 1,500 mg in 0.9% NaCl 250 mL intermittent infusion         1,500 mg  over 90 Minutes Intravenous ONCE 24 1637      24 1600  piperacillin-tazobactam (ZOSYN) 3.375 g vial to attach to  mL bag         3.375 g  over 30 Minutes Intravenous ONCE 24 1557              Contrast Orders - past 72 hours (72h ago, onward)      None                  Plan:  Start vancomycin  1500 mg IV once in ED.     Peace Osuna [facility]

## 2024-06-12 LAB
ACINETOBACTER SPECIES: NOT DETECTED
ANION GAP SERPL CALCULATED.3IONS-SCNC: 9 MMOL/L (ref 7–15)
BUN SERPL-MCNC: 8.6 MG/DL (ref 6–20)
CALCIUM SERPL-MCNC: 8.7 MG/DL (ref 8.6–10)
CHLORIDE SERPL-SCNC: 110 MMOL/L (ref 98–107)
CITROBACTER SPECIES: NOT DETECTED
CREAT SERPL-MCNC: 0.54 MG/DL (ref 0.51–0.95)
CTX-M: NOT DETECTED
DEPRECATED HCO3 PLAS-SCNC: 19 MMOL/L (ref 22–29)
EGFRCR SERPLBLD CKD-EPI 2021: >90 ML/MIN/1.73M2
ENTEROBACTER SPECIES: NOT DETECTED
ERYTHROCYTE [DISTWIDTH] IN BLOOD BY AUTOMATED COUNT: 13.7 % (ref 10–15)
ESCHERICHIA COLI: DETECTED
GLUCOSE SERPL-MCNC: 119 MG/DL (ref 70–99)
HCT VFR BLD AUTO: 32.2 % (ref 35–47)
HGB BLD-MCNC: 10.5 G/DL (ref 11.7–15.7)
IMP: NOT DETECTED
KLEBSIELLA OXYTOCA: NOT DETECTED
KLEBSIELLA PNEUMONIAE: NOT DETECTED
KPC: NOT DETECTED
MCH RBC QN AUTO: 29.4 PG (ref 26.5–33)
MCHC RBC AUTO-ENTMCNC: 32.6 G/DL (ref 31.5–36.5)
MCV RBC AUTO: 90 FL (ref 78–100)
NDM: NOT DETECTED
OXA (DETECTED/NOT DETECTED): NOT DETECTED
PLATELET # BLD AUTO: 173 10E3/UL (ref 150–450)
POTASSIUM SERPL-SCNC: 3.3 MMOL/L (ref 3.4–5.3)
POTASSIUM SERPL-SCNC: 3.9 MMOL/L (ref 3.4–5.3)
PROTEUS SPECIES: NOT DETECTED
PSEUDOMONAS AERUGINOSA: NOT DETECTED
RBC # BLD AUTO: 3.57 10E6/UL (ref 3.8–5.2)
SODIUM SERPL-SCNC: 138 MMOL/L (ref 135–145)
VIM: NOT DETECTED
WBC # BLD AUTO: 18.7 10E3/UL (ref 4–11)

## 2024-06-12 PROCEDURE — 99232 SBSQ HOSP IP/OBS MODERATE 35: CPT | Performed by: INTERNAL MEDICINE

## 2024-06-12 PROCEDURE — 258N000003 HC RX IP 258 OP 636: Mod: JZ | Performed by: INTERNAL MEDICINE

## 2024-06-12 PROCEDURE — 120N000001 HC R&B MED SURG/OB

## 2024-06-12 PROCEDURE — 250N000011 HC RX IP 250 OP 636: Mod: JZ | Performed by: INTERNAL MEDICINE

## 2024-06-12 PROCEDURE — 85027 COMPLETE CBC AUTOMATED: CPT | Performed by: INTERNAL MEDICINE

## 2024-06-12 PROCEDURE — 80048 BASIC METABOLIC PNL TOTAL CA: CPT | Performed by: INTERNAL MEDICINE

## 2024-06-12 PROCEDURE — 250N000013 HC RX MED GY IP 250 OP 250 PS 637: Performed by: INTERNAL MEDICINE

## 2024-06-12 PROCEDURE — 84132 ASSAY OF SERUM POTASSIUM: CPT | Performed by: INTERNAL MEDICINE

## 2024-06-12 PROCEDURE — 36415 COLL VENOUS BLD VENIPUNCTURE: CPT | Performed by: INTERNAL MEDICINE

## 2024-06-12 RX ORDER — POTASSIUM CHLORIDE 1500 MG/1
40 TABLET, EXTENDED RELEASE ORAL ONCE
Status: COMPLETED | OUTPATIENT
Start: 2024-06-12 | End: 2024-06-12

## 2024-06-12 RX ADMIN — PIPERACILLIN AND TAZOBACTAM 3.38 G: 3; .375 INJECTION, POWDER, FOR SOLUTION INTRAVENOUS at 08:43

## 2024-06-12 RX ADMIN — POTASSIUM CHLORIDE 40 MEQ: 1500 TABLET, EXTENDED RELEASE ORAL at 08:28

## 2024-06-12 RX ADMIN — ACETAMINOPHEN 650 MG: 325 TABLET ORAL at 02:29

## 2024-06-12 RX ADMIN — ACETAMINOPHEN 650 MG: 325 TABLET ORAL at 20:36

## 2024-06-12 RX ADMIN — ACETAMINOPHEN 650 MG: 325 TABLET ORAL at 16:31

## 2024-06-12 RX ADMIN — ACETAMINOPHEN 650 MG: 325 TABLET ORAL at 08:28

## 2024-06-12 RX ADMIN — SODIUM CHLORIDE: 9 INJECTION, SOLUTION INTRAVENOUS at 05:20

## 2024-06-12 RX ADMIN — PIPERACILLIN AND TAZOBACTAM 3.38 G: 3; .375 INJECTION, POWDER, FOR SOLUTION INTRAVENOUS at 16:31

## 2024-06-12 RX ADMIN — PIPERACILLIN AND TAZOBACTAM 3.38 G: 3; .375 INJECTION, POWDER, FOR SOLUTION INTRAVENOUS at 00:08

## 2024-06-12 NOTE — H&P
Admission History & Physical  Bryson Cornelius, 1995, [id number]    [facility]  Karlie Edmonds, [phone]    Assessment and Plan:  Acute right pyelonephritis  Sepsis  Tmax 104, leukocytosis 14  Lactic acidosis improving with fluids  Blood pressure is overall stable  Continue Zosyn  Follow-up on urine, blood cultures  IV fluids    DVT prophylaxis  Ambulation.    Clinically Significant Risk Factors Present on Admission                              # Overweight: Estimated body mass index is 29.29 kg/m  as calculated from the following:    Height as of this encounter: 1.524 m (5').    Weight as of this encounter: 68 kg (150 lb).                 Expected length of stay : anticipate hospitalization more than 2 days.     Chief Complaint: Flank pain    HPI:     Bryson Cornelius is a 29 year old female no significant past medical history presented to the emergency room with complaints of right-sided abdominal pain, flank pain, fevers and chills ongoing for 4 days.  She denied any associated nausea or vomiting, urinary burning pain frequency urgency.  No shortness of breath, chest pain, cough.  Never had a urine infection before.    She was found to be tachycardic, fever of 104 in the ED, lab workup revealed a leukocytosis of 14.9, lactic acidosis of 2.5, repeat after fluids was 1.2.  Urinalysis was concerning for UTI.  CT abdomen pelvis showed asymmetric right renal edema, right ureteral wall thickening, perinephric stranding.  She was given Zosyn, vancomycin and admitted.    Medical History  Past Medical History:   Diagnosis Date    Miscarriage 05/15/2019      Patient Active Problem List    Diagnosis Date Noted    Acute pyelonephritis 2024     Priority: Medium     (normal spontaneous vaginal delivery) 10/10/2023     Priority: Medium        Surgical History  She  has a past surgical history that includes No Past Surgeries; Pr Lap,Diagnostic Abdomen (N/A, 2019); and  DILATION/CURETTAGE DIAG/THER NON OB (N/A, 2019).     Past Surgical History:   Procedure Laterality Date    HC DILATION/CURETTAGE DIAG/THER NON OB N/A 2019    Procedure: DILATION AND CURETTAGE;  Surgeon: Linda Morrow MD;  Location: [facility];  Service: Gynecology    NO PAST SURGERIES      DE LAP,DIAGNOSTIC ABDOMEN N/A 2019    Procedure: LAPAROSCOPY WITH Right Salpingectomy, removal of ectopic preganancy;  Surgeon: Linda Morrow MD;  Location: [facility];  Service: Gynecology       Allergies  No Known Allergies    Prior to Admission Medications   (Not in a hospital admission)      Social History  Reviewed, and she  reports that she quit smoking about 5 years ago. She has never used smokeless tobacco. She reports that she does not currently use alcohol. She reports that she does not use drugs.  Social History     Tobacco Use    Smoking status: Former     Current packs/day: 0.00     Types: Cigarettes     Quit date: 2019     Years since quittin.1    Smokeless tobacco: Never   Substance Use Topics    Alcohol use: Not Currently       Family History  Not pertinent to present problem       Review Of Systems  Complete As per admission HPI, all other systems reviewed and negative.     Physical Exam:  Vital signs:  Temp: 99.4  F (37.4  C) Temp src: Oral BP: 112/65 Pulse: 95   Resp: (!) 36 SpO2: 97 % O2 Device: None (Room air)   Height: 152.4 cm (5') Weight: 68 kg (150 lb)  Estimated body mass index is 29.29 kg/m  as calculated from the following:    Height as of this encounter: 1.524 m (5').    Weight as of this encounter: 68 kg (150 lb).    General Appearance:  Awake Alert, orientedx3, not in any apparent distress   Head:  Normocephalic, without obvious abnormality   Eyes:  PERRL, conjunctiva/corneas clear   Throat: Oral mucosa moist   Neck: Supple,  no JVD   Lungs:   Clear to auscultation bilaterally, respirations unlabored   Chest Wall:  No tenderness   Heart:  Regular  rate and rhythm, S1, S2 normal,no murmur   Abdomen:   Soft, non-tender, bowel sounds present,  no guarding, rigidity right CVA tenderness present   Extremities:  no edema, no joint swelling   Skin: Skin color, texture, turgor normal, no rashes or lesions   Neurologic: Alert and oriented X 3, no focal deficits     Results:  Labs Ordered and Resulted from Time of ED Arrival to Time of ED Departure   LACTIC ACID WHOLE BLOOD WITH 1X REPEAT IN 2 HR WHEN >2 - Abnormal       Result Value    Lactic Acid, Initial 2.5 (*)    ROUTINE UA WITH MICROSCOPIC REFLEX TO CULTURE - Abnormal    Color Urine Light Yellow      Appearance Urine Clear      Glucose Urine Negative      Bilirubin Urine Negative      Ketones Urine Negative      Specific Gravity Urine 1.005      Blood Urine Negative      pH Urine 6.0      Protein Albumin Urine 10 (*)     Urobilinogen Urine <2.0      Nitrite Urine Negative      Leukocyte Esterase Urine 75 Reina/uL (*)     Bacteria Urine Few (*)     RBC Urine 1      WBC Urine 23 (*)     Squamous Epithelials Urine 7 (*)    BASIC METABOLIC PANEL - Abnormal    Sodium 136      Potassium 3.6      Chloride 102      Carbon Dioxide (CO2) 20 (*)     Anion Gap 14      Urea Nitrogen 8.3      Creatinine 0.65      GFR Estimate >90      Calcium 9.1      Glucose 142 (*)    CBC WITH PLATELETS AND DIFFERENTIAL - Abnormal    WBC Count 14.9 (*)     RBC Count 4.22      Hemoglobin 12.5      Hematocrit 37.9      MCV 90      MCH 29.6      MCHC 33.0      RDW 13.3      Platelet Count 231      % Neutrophils 91      % Lymphocytes 8      % Monocytes 1      % Eosinophils 0      % Basophils 0      % Immature Granulocytes 0      NRBCs per 100 WBC 0      Absolute Neutrophils 13.5 (*)     Absolute Lymphocytes 1.1      Absolute Monocytes 0.2      Absolute Eosinophils 0.0      Absolute Basophils 0.0      Absolute Immature Granulocytes 0.0      Absolute NRBCs 0.0     HCG QUALITATIVE PREGNANCY - Normal    hCG Serum Qualitative Negative     INFLUENZA  A/B, RSV, & SARS-COV2 PCR - Normal    Influenza A PCR Negative      Influenza B PCR Negative      RSV PCR Negative      SARS CoV2 PCR Negative     LACTIC ACID WHOLE BLOOD - Normal    Lactic Acid 1.2     BLOOD CULTURE   BLOOD CULTURE   URINE CULTURE      EKG:  EKG: Sinus tachycardia with artifact.    Imaging:   Abd/pelvis CT no contrast - Stone Protocol    Result Date: 6/11/2024  EXAM: CT ABDOMEN PELVIS W/O CONTRAST LOCATION: [facility] DATE: 6/11/2024 INDICATION: Right lower abdominal pain. Fever and chills. COMPARISON: None. TECHNIQUE: CT scan of the abdomen and pelvis was performed without IV contrast. Multiplanar reformats were obtained. Dose reduction techniques were used. CONTRAST: None. FINDINGS: LOWER CHEST: Normal. HEPATOBILIARY: Normal. PANCREAS: Normal. SPLEEN: Normal. ADRENAL GLANDS: Normal. KIDNEYS/BLADDER: Asymmetric right renal edema and perinephric stranding. Mild right ureteral wall thickening. Grossly unremarkable left kidney. Mildly distended bladder. BOWEL: Unremarkable bowel and appendix. LYMPH NODES: No adenopathy. VASCULATURE: Nonaneurysmal aorta. PELVIC ORGANS: Unremarkable. MUSCULOSKELETAL: Unremarkable. OTHER: No significant free fluid or air.     IMPRESSION: 1.  Asymmetric right renal edema and right ureteral wall thickening. Given history of chills and fever, findings suggest ascending urinary tract infection and pyelonephritis. Differential would include a recently passed collecting system stone. 2.  No hydronephrosis.     XR Chest 2 Views    Result Date: 6/11/2024  EXAM: XR CHEST 2 VIEWS LOCATION: [facility] DATE: 6/11/2024 INDICATION: fever COMPARISON: None.     IMPRESSION: No acute abnormality. No focal pulmonary infiltrate, pleural effusion, or pneumothorax. The cardiac size and mediastinal contours appear within normal limits.         55 MINUTES SPENT BY ME on the date of service doing chart review, history, exam, documentation &  further activities per the note.      Evonne Webster M.D  [facility] Service  Internal Medicine    6/11/2024  8:15 PM

## 2024-06-12 NOTE — PLAN OF CARE
Problem: Pain Acute  Goal: Optimal Pain Control and Function  Outcome: Progressing  Intervention: Develop Pain Management Plan  Recent Flowsheet Documentation  Taken 6/12/2024 0413 by Sandra Sue RN  Pain Management Interventions: declines  Taken 6/12/2024 0007 by Sandra Sue RN  Pain Management Interventions: declines  Taken 6/11/2024 2045 by Sandra Sue RN  Pain Management Interventions: declines  Intervention: Prevent or Manage Pain  Recent Flowsheet Documentation  Taken 6/12/2024 0413 by Sandra Sue RN  Medication Review/Management: medications reviewed  Taken 6/12/2024 0007 by Sandra Sue RN  Medication Review/Management: medications reviewed  Taken 6/11/2024 2045 by Sandra Sue RN  Medication Review/Management: medications reviewed     Problem: UTI (Urinary Tract Infection)  Goal: Improved Infection Symptoms  Outcome: Progressing   Goal Outcome Evaluation:  Patient A&O x4, able to make needs known. VSS on RA. NSR on telemetry. Pain rated 3/10 in right flank. Pain controlled with PRN Tylenol. Right PIV infusing NS at 100mL/hr. Intermittent IV antibiotics. Denied SOB, VILLATORO, and chest pain. Lung sounds clear. No skin issues. Voiding spontaneously. No BM during shift. Regular diet, tolerating well. Independent with activity. Discharge pending.

## 2024-06-12 NOTE — PLAN OF CARE
Problem: Adult Inpatient Plan of Care  Goal: Absence of Hospital-Acquired Illness or Injury  Intervention: Identify and Manage Fall Risk  Recent Flowsheet Documentation  Taken 6/12/2024 1207 by Teagan Garcia RN  Safety Promotion/Fall Prevention: safety round/check completed  Problem: Pain Acute  Goal: Optimal Pain Control and Function  Outcome: Progressing  Intervention: Prevent or Manage Pain  Recent Flowsheet Documentation  Taken 6/12/2024 1138 by Teagan Garcia RN  Medication Review/Management: medications reviewed  Intervention: Optimize Psychosocial Wellbeing  Recent Flowsheet Documentation  Taken 6/12/2024 1138 by Teagan Garcia RN  Supportive Measures: active listening utilized  Problem: UTI (Urinary Tract Infection)  Goal: Improved Infection Symptoms  Outcome: Progressing    Goal Outcome Evaluation: Received patient from the ED A&Ox4, independent, steady gait, no skin issues, VSS afebrile on room air, denied any pain at this time, on IV continuous NS at 50ml/hr, on IV Zosyn, potassium protocol, potassium level of 3.3 resulted at 5:34AM today replaced at the ED by Simona WATSON at 8:28AM will be re-checked at 12:59PM. On regular diet, continent bladder and bowel. Will continue to monitor. -Teagan ESPARZA RN

## 2024-06-12 NOTE — PROVIDER NOTIFICATION
Paged Dr. Johnson, Received a call from micro, blood cultures from 6/11 at 1602 are positive on first day of incubation with gram negative bacilli. Please advise.   Sandra Sue RN on 6/12/2024 at 6:34 AM    Verigene GN panel ordered by provider.   Sandra Sue RN on 6/12/2024 at 6:36 AM

## 2024-06-13 LAB
ANION GAP SERPL CALCULATED.3IONS-SCNC: 11 MMOL/L (ref 7–15)
ATRIAL RATE - MUSE: 129 BPM
BACTERIA UR CULT: NORMAL
BUN SERPL-MCNC: 3.9 MG/DL (ref 6–20)
CALCIUM SERPL-MCNC: 8.8 MG/DL (ref 8.6–10)
CHLORIDE SERPL-SCNC: 107 MMOL/L (ref 98–107)
CREAT SERPL-MCNC: 0.61 MG/DL (ref 0.51–0.95)
DEPRECATED HCO3 PLAS-SCNC: 19 MMOL/L (ref 22–29)
DIASTOLIC BLOOD PRESSURE - MUSE: NORMAL MMHG
EGFRCR SERPLBLD CKD-EPI 2021: >90 ML/MIN/1.73M2
ERYTHROCYTE [DISTWIDTH] IN BLOOD BY AUTOMATED COUNT: 13.5 % (ref 10–15)
GLUCOSE SERPL-MCNC: 103 MG/DL (ref 70–99)
HCT VFR BLD AUTO: 33.1 % (ref 35–47)
HGB BLD-MCNC: 10.6 G/DL (ref 11.7–15.7)
INTERPRETATION ECG - MUSE: NORMAL
LACTATE SERPL-SCNC: 0.7 MMOL/L (ref 0.7–2)
MCH RBC QN AUTO: 28.9 PG (ref 26.5–33)
MCHC RBC AUTO-ENTMCNC: 32 G/DL (ref 31.5–36.5)
MCV RBC AUTO: 90 FL (ref 78–100)
P AXIS - MUSE: 53 DEGREES
PLATELET # BLD AUTO: 193 10E3/UL (ref 150–450)
POTASSIUM SERPL-SCNC: 3.9 MMOL/L (ref 3.4–5.3)
PR INTERVAL - MUSE: 128 MS
QRS DURATION - MUSE: 76 MS
QT - MUSE: 304 MS
QTC - MUSE: 445 MS
R AXIS - MUSE: 95 DEGREES
RBC # BLD AUTO: 3.67 10E6/UL (ref 3.8–5.2)
SODIUM SERPL-SCNC: 137 MMOL/L (ref 135–145)
SYSTOLIC BLOOD PRESSURE - MUSE: NORMAL MMHG
T AXIS - MUSE: 42 DEGREES
VENTRICULAR RATE- MUSE: 129 BPM
WBC # BLD AUTO: 15.7 10E3/UL (ref 4–11)

## 2024-06-13 PROCEDURE — 85027 COMPLETE CBC AUTOMATED: CPT | Performed by: INTERNAL MEDICINE

## 2024-06-13 PROCEDURE — 99232 SBSQ HOSP IP/OBS MODERATE 35: CPT | Performed by: INTERNAL MEDICINE

## 2024-06-13 PROCEDURE — 250N000011 HC RX IP 250 OP 636: Mod: JZ | Performed by: INTERNAL MEDICINE

## 2024-06-13 PROCEDURE — 83605 ASSAY OF LACTIC ACID: CPT | Performed by: INTERNAL MEDICINE

## 2024-06-13 PROCEDURE — 36415 COLL VENOUS BLD VENIPUNCTURE: CPT | Performed by: INTERNAL MEDICINE

## 2024-06-13 PROCEDURE — 250N000013 HC RX MED GY IP 250 OP 250 PS 637: Performed by: INTERNAL MEDICINE

## 2024-06-13 PROCEDURE — 80048 BASIC METABOLIC PNL TOTAL CA: CPT | Performed by: INTERNAL MEDICINE

## 2024-06-13 PROCEDURE — 258N000003 HC RX IP 258 OP 636: Mod: JZ | Performed by: INTERNAL MEDICINE

## 2024-06-13 PROCEDURE — 120N000001 HC R&B MED SURG/OB

## 2024-06-13 RX ADMIN — PIPERACILLIN AND TAZOBACTAM 3.38 G: 3; .375 INJECTION, POWDER, FOR SOLUTION INTRAVENOUS at 00:30

## 2024-06-13 RX ADMIN — SODIUM CHLORIDE: 9 INJECTION, SOLUTION INTRAVENOUS at 09:16

## 2024-06-13 RX ADMIN — ACETAMINOPHEN 650 MG: 325 TABLET ORAL at 17:57

## 2024-06-13 RX ADMIN — PIPERACILLIN AND TAZOBACTAM 3.38 G: 3; .375 INJECTION, POWDER, FOR SOLUTION INTRAVENOUS at 09:15

## 2024-06-13 RX ADMIN — ACETAMINOPHEN 650 MG: 325 TABLET ORAL at 13:31

## 2024-06-13 RX ADMIN — ACETAMINOPHEN 650 MG: 325 TABLET ORAL at 00:39

## 2024-06-13 RX ADMIN — ACETAMINOPHEN 650 MG: 325 TABLET ORAL at 06:15

## 2024-06-13 RX ADMIN — PIPERACILLIN AND TAZOBACTAM 3.38 G: 3; .375 INJECTION, POWDER, FOR SOLUTION INTRAVENOUS at 17:57

## 2024-06-13 ASSESSMENT — ACTIVITIES OF DAILY LIVING (ADL)
ADLS_ACUITY_SCORE: 35
ADLS_ACUITY_SCORE: 20
ADLS_ACUITY_SCORE: 35
ADLS_ACUITY_SCORE: 20
ADLS_ACUITY_SCORE: 35
ADLS_ACUITY_SCORE: 20
ADLS_ACUITY_SCORE: 35
ADLS_ACUITY_SCORE: 35
ADLS_ACUITY_SCORE: 20
ADLS_ACUITY_SCORE: 35

## 2024-06-13 NOTE — PROGRESS NOTES
[facility] MEDICINE PROGRESS NOTE      Identification/Summary: Bryson Cornelius is a 29 year old female with no significant past medical history who was admitted on 6/11/2024 for pyelonephritis and sepsis. Started on Zosyn and received a dose of vancomycin in the ED. Her blood cultures came back positive for E. Coli and final urine culture results showed mixed jacqueline. Final blood culture pending. Fever earlier today discharge likely 1 day.     Assessment and Plan:  Acute Pyelonephritis  Sepsis  E. coli bacteremia  Clinically improving  Tmax 104.5 improving, leukocytosis 14.9-18.7-15.7  Lactic acidosis improving with fluids  UA positive for proteinuria, pyuria, and leukocyte esterase  CT positive for right sided renal edema, uretal wall thickening, and perinephric stranding  Blood cultures positive for E.coli, final result pending  Urine culture shows mixed jacqueline  Blood pressure stable  Febrile event overnight on 6/12, Tmax 101.4, treated with acetaminophen   Continue IVF  Continue Zosyn     Hypokalemia  Replace per protocol     Mild normocytic anemia  No sign of bleeding.  Monitor. Likely related to IV fluids        Clinically Significant Risk Factors Present on Admission         # Hypokalemia: Lowest K = 3.3 mmol/L in last 2 days, will replace as needed              # Anemia: based on hgb <11     # Overweight: Estimated body mass index is 29.29 kg/m  as calculated from the following:    Height as of this encounter: 1.524 m (5').    Weight as of this encounter: 68 kg (150 lb).                  Diet: Combination Diet Regular Diet Adult  Rodriguez Catheter: Not present  Lines: None        DVT Prophylaxis:  Low Risk/Ambulatory with no VTE prophylaxis indicated  Code Status: Full Code     Anticipated possible discharge in 1 days pending sensitivities      Disposition Plan     Expected Discharge Date: 06/13/2024                   The patient's care was discussed with the Attending Physician, Dr. Webster  .     Shaquille Dobbs  [facility]  Phone: #[phone]  Securely message with the [employer] Web Console (learn more here)  Text page via [employer] Paging/Directory     Interval History/Subjective:  Experienced fever/chills overnight. States the chills weren't as severe as those experienced prior to hospital admission. Feeling better now, denies other symptoms during overnight period, denies chest pain, dyspnea, nausea/vomiting, abdominal pain, flank pain, and dysuria.    Physical Exam/Objective:  Temp:  [98.2  F (36.8  C)-101.4  F (38.6  C)] 100  F (37.8  C)  Pulse:  [] 93  Resp:  [17-27] 18  BP: ()/(65-77) 124/76  SpO2:  [95 %-100 %] 95 %  Body mass index is 30.84 kg/m .  General: Alert and oriented, well developed, well nourished female who appears stated age and is resting comfortably bed  Eyes: No erythema, edema, or icterus  Cardiac: RRR, no murmurs or gallops, non-tachycardic   Respiratory: Non-labored breathing, no cyanosis noted  Abdominal: Non-distended abdomen. Mild tenderness noted in RUQ  Musculoskeletal: Moves all limbs spontaneously, no abnormalities noted  Skin: No erythema, jaundice, or rash noted on exposed skin   Neurologic: Answers all questions appropriately      Data reviewed today: I personally reviewed all new medications, labs, imaging/diagnostics reports over the past 24 hours. Pertinent findings include:    Imaging:   No results found for this or any previous visit (from the past 24 hour(s)).    Labs:  Most Recent 3 CBC's:  Recent Labs   Lab Test 06/13/24  0635 06/12/24  0534 06/11/24  1602   WBC 15.7* 18.7* 14.9*   HGB 10.6* 10.5* 12.5   MCV 90 90 90    173 231     Most Recent 3 BMP's:  Recent Labs   Lab Test 06/13/24  0635 06/12/24  1244 06/12/24  0534 06/11/24  1602     --  138 136   POTASSIUM 3.9 3.9 3.3* 3.6   CHLORIDE 107  --  110* 102   CO2 19*  --  19* 20*   BUN 3.9*  --  8.6 8.3   CR 0.61  --  0.54 0.65   ANIONGAP  11  --  9 14   ELLE 8.8  --  8.7 9.1   *  --  119* 142*         All cultures:  Recent Labs   Lab 06/11/24  1644 06/11/24  1638 06/11/24  1602   CULTURE 50,000-100,000 CFU/mL Mixture of urogenital jacqueline Positive on the 1st day of incubation*  Escherichia coli* Positive on the 1st day of incubation*  Escherichia coli*         Most Recent Urinalysis:  Recent Labs   Lab Test 06/11/24  1644   COLOR Light Yellow   APPEARANCE Clear   URINEGLC Negative   URINEBILI Negative   URINEKETONE Negative   SG 1.005   UBLD Negative   URINEPH 6.0   PROTEIN 10*   NITRITE Negative   LEUKEST 75 Reina/uL*   RBCU 1   WBCU 23*       Medications:   Personally Reviewed.  Medications   Current Facility-Administered Medications   Medication Dose Route Frequency Provider Last Rate Last Admin    sodium chloride 0.9 % infusion   Intravenous Continuous Evonne Webster MD 50 mL/hr at 06/13/24 0916 New Bag at 06/13/24 0916     Current Facility-Administered Medications   Medication Dose Route Frequency Provider Last Rate Last Admin    piperacillin-tazobactam (ZOSYN) 3.375 g vial to attach to  mL bag  3.375 g Intravenous Q8H Evonne Webster MD   3.375 g at 06/13/24 0915    sodium chloride (PF) 0.9% PF flush 3 mL  3 mL Intracatheter Q8H Evonne Webster MD   3 mL at 06/11/24 2042          43 minutes were spent in evaluating this patient, including history, physical exam, care coordination.     I agree with the documentation and findings as written by Shaquille MOJICA and our discussed and formulated assessment and plan. I was present with Shaquille MOJICA who participated in the service and documentation of the note. I have also personally verified the history and personally performed the physical exam and medical decision-making. I agree with the assessment and plan of care as documented in the note.     Evonne Webster MD  Internal Medicine  Hospitalist  [facility]  Phone: #[phone]

## 2024-06-13 NOTE — PLAN OF CARE
Problem: UTI (Urinary Tract Infection)  Goal: Improved Infection Symptoms  Outcome: Progressing   Goal Outcome Evaluation:       Patient spiked a fever of 101.4 overnight, received Tylenol, recheck was 99.7. Spiked a fever again this morning of 101.4, received Tylenol again. Recheck in the morning. Independent in room.

## 2024-06-13 NOTE — PROGRESS NOTES
Noted Patient is transferred to [address]. Per Charge Nurse Dale she gave report to receiving RN Gela ESPARZA RN

## 2024-06-13 NOTE — PLAN OF CARE
Problem: Adult Inpatient Plan of Care  Goal: Absence of Hospital-Acquired Illness or Injury  Intervention: Identify and Manage Fall Risk  Recent Flowsheet Documentation  Taken 6/13/2024 0750 by Teagan Garcia RN  Safety Promotion/Fall Prevention:   safety round/check completed   room organization consistent   room near nurse's station   room door open   patient and family education   nonskid shoes/slippers when out of bed   mobility aid in reach   lighting adjusted   increase visualization of patient   increased rounding and observation   clutter free environment maintained  Problem: Adult Inpatient Plan of Care  Goal: Absence of Hospital-Acquired Illness or Injury  Intervention: Prevent Skin Injury  Recent Flowsheet Documentation  Taken 6/13/2024 0750 by Teagan Garcia RN  Body Position: position changed independently  Skin Protection: adhesive use limited  Device Skin Pressure Protection: tubing/devices free from skin contact  Problem: Adult Inpatient Plan of Care  Goal: Absence of Hospital-Acquired Illness or Injury  Intervention: Prevent Infection  Recent Flowsheet Documentation  Taken 6/13/2024 0750 by Teagan Garcia RN  Infection Prevention:   hand hygiene promoted   personal protective equipment utilized   rest/sleep promoted   single patient room provided  Problem: Pain Acute  Goal: Optimal Pain Control and Function  Outcome: Progressing  Intervention: Prevent or Manage Pain  Recent Flowsheet Documentation  Taken 6/13/2024 0750 by Teagan Garcia RN  Medication Review/Management: medications reviewed  Intervention: Optimize Psychosocial Wellbeing  Recent Flowsheet Documentation  Taken 6/13/2024 0750 by Teagan Garcia RN  Supportive Measures: active listening utilized    Goal Outcome Evaluation: A&Ox4, denied pain, VSS on room air, afebrile T was at 101.4 at 6:13AM overnight RN gave PRN Tylenol at 6:15AM. Shift assessment temperature currently at 100, Tylenol is q4hrs. Independent with ADLs and  ambulation, spouse Regulo at the bedside stayed overnight with patient, no report of discomfort.  On IV NS at 50ml/hr, and IV Zosyn. Potassium level today is 3.9 recheck AM draw 6/14/24. Will continue to monitor. -Teagan ESPARZA RN

## 2024-06-13 NOTE — PLAN OF CARE
Problem: Pain Acute  Goal: Optimal Pain Control and Function  Outcome: Progressing  Intervention: Develop Pain Management Plan  Recent Flowsheet Documentation  Taken 6/13/2024 1635 by Gela Golden, RN  Pain Management Interventions: medication (see MAR)   Goal Outcome Evaluation:      Plan of Care Reviewed With: patient    Overall Patient Progress: improvingOverall Patient Progress: improving     Patient rates her pain 3/10. States she is having a headache. Denies other symptoms. Voiding without difficulty. Taking tylenol with some relief.

## 2024-06-13 NOTE — PLAN OF CARE
Problem: Pain Acute  Goal: Optimal Pain Control and Function  Outcome: Progressing   Goal Outcome Evaluation:  Pt A/O. PIV infusing IVABX/continuous IVF. Ambulating steady stable and ind in room. R sided abd/flank pain managed with PRN tylenol x2 this shift. Evening oral temp of 99.8. UC pending. Uses call light appropriately. Patient has three children under the age of 3 years old at home that she is hoping to get back to soon to help her significant other.

## 2024-06-14 VITALS
SYSTOLIC BLOOD PRESSURE: 139 MMHG | HEART RATE: 72 BPM | RESPIRATION RATE: 17 BRPM | WEIGHT: 157.9 LBS | HEIGHT: 60 IN | DIASTOLIC BLOOD PRESSURE: 82 MMHG | BODY MASS INDEX: 31 KG/M2 | TEMPERATURE: 98.5 F | OXYGEN SATURATION: 99 %

## 2024-06-14 LAB
ANION GAP SERPL CALCULATED.3IONS-SCNC: 13 MMOL/L (ref 7–15)
BACTERIA BLD CULT: ABNORMAL
BUN SERPL-MCNC: 5.7 MG/DL (ref 6–20)
CALCIUM SERPL-MCNC: 9.1 MG/DL (ref 8.6–10)
CHLORIDE SERPL-SCNC: 106 MMOL/L (ref 98–107)
CREAT SERPL-MCNC: 0.62 MG/DL (ref 0.51–0.95)
DEPRECATED HCO3 PLAS-SCNC: 21 MMOL/L (ref 22–29)
EGFRCR SERPLBLD CKD-EPI 2021: >90 ML/MIN/1.73M2
ERYTHROCYTE [DISTWIDTH] IN BLOOD BY AUTOMATED COUNT: 13.6 % (ref 10–15)
GLUCOSE SERPL-MCNC: 94 MG/DL (ref 70–99)
HCT VFR BLD AUTO: 35.5 % (ref 35–47)
HGB BLD-MCNC: 11.3 G/DL (ref 11.7–15.7)
MCH RBC QN AUTO: 29 PG (ref 26.5–33)
MCHC RBC AUTO-ENTMCNC: 31.8 G/DL (ref 31.5–36.5)
MCV RBC AUTO: 91 FL (ref 78–100)
PLATELET # BLD AUTO: 224 10E3/UL (ref 150–450)
POTASSIUM SERPL-SCNC: 3.9 MMOL/L (ref 3.4–5.3)
RBC # BLD AUTO: 3.9 10E6/UL (ref 3.8–5.2)
SODIUM SERPL-SCNC: 140 MMOL/L (ref 135–145)
WBC # BLD AUTO: 8.5 10E3/UL (ref 4–11)

## 2024-06-14 PROCEDURE — 250N000013 HC RX MED GY IP 250 OP 250 PS 637: Performed by: INTERNAL MEDICINE

## 2024-06-14 PROCEDURE — 99239 HOSP IP/OBS DSCHRG MGMT >30: CPT | Performed by: INTERNAL MEDICINE

## 2024-06-14 PROCEDURE — 250N000011 HC RX IP 250 OP 636: Mod: JZ | Performed by: INTERNAL MEDICINE

## 2024-06-14 PROCEDURE — 36415 COLL VENOUS BLD VENIPUNCTURE: CPT | Performed by: INTERNAL MEDICINE

## 2024-06-14 PROCEDURE — 82374 ASSAY BLOOD CARBON DIOXIDE: CPT | Performed by: INTERNAL MEDICINE

## 2024-06-14 PROCEDURE — 85018 HEMOGLOBIN: CPT | Performed by: INTERNAL MEDICINE

## 2024-06-14 RX ORDER — CEFDINIR 300 MG/1
300 CAPSULE ORAL 2 TIMES DAILY
Qty: 22 CAPSULE | Refills: 0 | Status: SHIPPED | OUTPATIENT
Start: 2024-06-14 | End: 2024-06-14

## 2024-06-14 RX ADMIN — PIPERACILLIN AND TAZOBACTAM 3.38 G: 3; .375 INJECTION, POWDER, FOR SOLUTION INTRAVENOUS at 00:35

## 2024-06-14 RX ADMIN — PIPERACILLIN AND TAZOBACTAM 3.38 G: 3; .375 INJECTION, POWDER, FOR SOLUTION INTRAVENOUS at 08:02

## 2024-06-14 RX ADMIN — ACETAMINOPHEN 650 MG: 325 TABLET ORAL at 00:18

## 2024-06-14 ASSESSMENT — ACTIVITIES OF DAILY LIVING (ADL)
ADLS_ACUITY_SCORE: 20

## 2024-06-14 NOTE — PLAN OF CARE
Problem: Adult Inpatient Plan of Care  Goal: Optimal Comfort and Wellbeing  Outcome: Progressing  Intervention: Monitor Pain and Promote Comfort  Recent Flowsheet Documentation  Taken 6/14/2024 0000 by Lizzy Chacon RN  Pain Management Interventions: care clustered  Taken 6/13/2024 2000 by Lizzy Chacon RN  Pain Management Interventions: care clustered     Patient's vitals are stable. Temp 99.2. No complaints of pain, no nausea. Up independently, voiding. Will continue to monitor.     SAMANTHA Chacon RN

## 2024-06-14 NOTE — DISCHARGE SUMMARY
[facility] MEDICINE  DISCHARGE SUMMARY     Primary Care Physician: Karlie Edmonds  Admission Date: 6/11/2024   Discharge Provider: Evonne Webster MD Discharge Date: 6/14/2024   Diet: Orders Placed This Encounter      Combination Diet Regular Diet Adult      Diet      Code Status: Full Code   Activity: activity as tolerated   [facility]      Condition at Discharge: Stable      REASON FOR PRESENTATION(See Admission Note for Details)   Presented due to fevers, chills, and right sided abdominal/flank pain ongoing for 4 days    PRINCIPAL & ACTIVE DISCHARGE DIAGNOSES     Active Problems:    Acute pyelonephritis  Sepsis  E.Coli bacteremia  Hypokalemia  Mild normocytic anemia    SIGNIFICANT FINDINGS (Imaging, labs):     Most Recent 3 CBC's:  Recent Labs   Lab Test 06/14/24  0742 06/13/24  0635 06/12/24  0534   WBC 8.5 15.7* 18.7*   HGB 11.3* 10.6* 10.5*   MCV 91 90 90    193 173      Most Recent 3 BMP's:  Recent Labs   Lab Test 06/14/24  0742 06/13/24  0635 06/12/24  1244 06/12/24  0534    137  --  138   POTASSIUM 3.9 3.9 3.9 3.3*   CHLORIDE 106 107  --  110*   CO2 21* 19*  --  19*   BUN 5.7* 3.9*  --  8.6   CR 0.62 0.61  --  0.54   ANIONGAP 13 11  --  9   ELLE 9.1 8.8  --  8.7   GLC 94 103*  --  119*         Results for orders placed or performed during the hospital encounter of 06/11/24   XR Chest 2 Views    Narrative    EXAM: XR CHEST 2 VIEWS  LOCATION: [facility]  DATE: 6/11/2024    INDICATION: fever  COMPARISON: None.      Impression    IMPRESSION:   No acute abnormality.    No focal pulmonary infiltrate, pleural effusion, or pneumothorax. The cardiac size and mediastinal contours appear within normal limits.    Abd/pelvis CT no contrast - Stone Protocol    Narrative    EXAM: CT ABDOMEN PELVIS W/O CONTRAST  LOCATION: [facility]  DATE: 6/11/2024    INDICATION: Right lower abdominal pain. Fever and  chills.  COMPARISON: None.  TECHNIQUE: CT scan of the abdomen and pelvis was performed without IV contrast. Multiplanar reformats were obtained. Dose reduction techniques were used.  CONTRAST: None.    FINDINGS:   LOWER CHEST: Normal.    HEPATOBILIARY: Normal.    PANCREAS: Normal.    SPLEEN: Normal.    ADRENAL GLANDS: Normal.    KIDNEYS/BLADDER: Asymmetric right renal edema and perinephric stranding. Mild right ureteral wall thickening. Grossly unremarkable left kidney. Mildly distended bladder.    BOWEL: Unremarkable bowel and appendix.    LYMPH NODES: No adenopathy.    VASCULATURE: Nonaneurysmal aorta.    PELVIC ORGANS: Unremarkable.    MUSCULOSKELETAL: Unremarkable.    OTHER: No significant free fluid or air.       Impression    IMPRESSION:     1.  Asymmetric right renal edema and right ureteral wall thickening. Given history of chills and fever, findings suggest ascending urinary tract infection and pyelonephritis. Differential would include a recently passed collecting system stone.    2.  No hydronephrosis.            PENDING LABS         PROCEDURES ( this hospitalization only)          RECOMMENDATION FOR F/U VISIT       DISPOSITION     Home    SUMMARY OF HOSPITAL COURSE:      29-year-old lady no significant past medical history presented to ED due to right sided flank pain/abdominal pain for 4 days, developed fever/chills 6/10. Found to have severe sepsis with Tmax of 104.5, leukocytosis of 18.7, lactic acid of 2.5, sepsis protocol initiated. IV fluids, Zosyn, and vancomycin initiated.  Urinalysis consistent with pyelonephritis.  CT abdomen pelvis positive for right renal edema and thickening of right ureter. Blood cultures showed E.Coli bacteremia, urine cultures showed mixed jacqueline.  She is discharging on Augmentin for 11 more days to complete 2-week course. She is feeling improved at the time of the discharge.    Discharge Medications with Med changes:        Review of your medicines        START taking         Dose / Directions   amoxicillin-clavulanate 875-125 MG tablet  Commonly known as: AUGMENTIN      Dose: 1 tablet  Take 1 tablet by mouth 2 times daily for 11 days  Quantity: 22 tablet  Refills: 0            CONTINUE these medicines which have NOT CHANGED        Dose / Directions   ibuprofen 200 MG tablet  Commonly known as: ADVIL/MOTRIN      Dose: 200-400 mg  Take 200-400 mg by mouth every 4 hours as needed for pain (do not exceed 3,200 mg of ibuprofen in 24 hours)  Refills: 0     Slynd 4 MG Tabs tablet  Generic drug: drospirenone      Dose: 1 tablet  Take 1 tablet by mouth daily Drospirenone  Refills: 0               Where to get your medicines        These medications were sent to [place] [id number] [place] [facility] [place], [place] - [address] [place] [address]  [address] [place] [facility] [address]      Phone: [phone]   amoxicillin-clavulanate 875-125 MG tablet              Rationale for medication changes:    Augmentin for pyelonephritis, E. coli bacteremia        Consults     No consultations    Immunizations given this encounter     [unfilled]      Discharge Procedure Orders   Reason for your hospital stay   Order Comments: Kidney infection with bacteria in blood     Follow-up and recommended labs and tests    Order Comments: Follow up with primary care provider, Karlie Edmonds, within 7 days     Activity   Order Comments: Your activity upon discharge: activity as tolerated     Order Specific Question Answer Comments   Is discharge order? Yes      Diet   Order Comments: Follow this diet upon discharge: Orders Placed This Encounter      Combination Diet Regular Diet Adult     Order Specific Question Answer Comments   Is discharge order? Yes      Examination     Vital Signs in last 24 hours:   Vital signs:  Temp: 98.5  F (36.9  C) Temp src: Oral BP: 139/82 Pulse: 72   Resp: 17 SpO2: 99 % O2 Device: None (Room air)   Height: 152.4 cm (5') Weight: 71.6 kg (157 lb 14.4 oz)  Estimated body  mass index is 30.84 kg/m  as calculated from the following:    Height as of this encounter: 1.524 m (5').    Weight as of this encounter: 71.6 kg (157 lb 14.4 oz).       Pertinent positives on exam: No fevers/chills, no right sided CVA tenderness.      Please see EMR for more detailed significant labs, imaging, consultant notes etc.  Total time spent on discharge: > 35 minutes    Evonne Webster MD   [facility]: Ph:[phone]    CC:Karlie Edmonds    I agree with the documentation and findings as written by YUNIOR Khan and our discussed and formulated assessment and plan. I was present with him who participated in the service and documentation of the note. I have also personally verified the history and personally performed the physical exam and medical decision-making. I agree with the assessment and plan of care as documented in the note.    Evonne Webster MD  Internal Medicine  Hospitalist  [facility]  Phone: #[phone]

## 2024-06-14 NOTE — PLAN OF CARE
Goal Outcome Evaluation:      Plan of Care Reviewed With: patient, spouse    Overall Patient Progress: improvingOverall Patient Progress: improving         Pt discharging home today. Reviewed discharge instructions, new meds, follow ups, and warning signs. Pt verbalized understanding. Spouse providing ride home.

## 2024-06-24 ENCOUNTER — OFFICE VISIT (OUTPATIENT)
Dept: FAMILY MEDICINE | Facility: CLINIC | Age: 29
End: 2024-06-24
Payer: COMMERCIAL

## 2024-06-24 VITALS
SYSTOLIC BLOOD PRESSURE: 102 MMHG | TEMPERATURE: 98.1 F | HEART RATE: 77 BPM | HEIGHT: 60 IN | WEIGHT: 148 LBS | BODY MASS INDEX: 29.06 KG/M2 | DIASTOLIC BLOOD PRESSURE: 73 MMHG | RESPIRATION RATE: 16 BRPM | OXYGEN SATURATION: 98 %

## 2024-06-24 DIAGNOSIS — N10 ACUTE PYELONEPHRITIS: Primary | ICD-10-CM

## 2024-06-24 PROBLEM — O03.9 SAB (SPONTANEOUS ABORTION): Status: RESOLVED | Noted: 2019-12-09 | Resolved: 2024-06-24

## 2024-06-24 PROCEDURE — 99203 OFFICE O/P NEW LOW 30 MIN: CPT

## 2024-06-24 PROCEDURE — G2211 COMPLEX E/M VISIT ADD ON: HCPCS

## 2024-06-24 ASSESSMENT — PAIN SCALES - GENERAL: PAINLEVEL: NO PAIN (0)

## 2024-06-24 NOTE — PROGRESS NOTES
Assessment & Plan     Acute pyelonephritis  Patient admitted to the hospital 6/11/2024 through 6/14/2024 for acute pyelonephritis. She is still finishing her outpatient course of Augmentin.  She has no recurrent symptoms since hospital discharge.  Patient is educated regarding worrisome symptoms and when she should follow-up for evaluation.    MED REC REQUIRED  Post Medication Reconciliation Status:  Discharge medications reconciled, continue medications without change    Subjective   Bryson is a 29 year old, presenting for the following health issues:  Hospital F/U (ER follow up 6/11/24 UTI treated , feeling better , )      6/24/2024     2:50 PM   Additional Questions   Roomed by kelvin bell     HPI     Admitted to hospital 6/11/24-6/14/24 for acute pyelonephritis. Discharged home with augmentin to complete a 2 week course. Patient is currently finishing up her course of antibiotics with no acute concerns.     Reports things have been going well since her hospital discharge. She hasn't had any recurrent flank pain or UTI symptoms. No fevers. She is eating and drinking well..     Review of Systems  Constitutional, HEENT, cardiovascular, pulmonary, gi and gu systems are negative, except as otherwise noted.      Objective    /73   Pulse 77   Temp 98.1  F (36.7  C)   Resp 16   Ht 1.524 m (5')   Wt 67.1 kg (148 lb)   LMP 05/29/2024 (Approximate)   SpO2 98%   Breastfeeding No   BMI 28.90 kg/m    Body mass index is 28.9 kg/m .  Physical Exam   GENERAL: alert and no distress  EYES: Eyes grossly normal to inspection, conjunctivae and sclerae normal  NECK: no adenopathy, no asymmetry, masses, or scars  RESP: normal respiratory effort   CV: regular rate and rhythm, normal S1 S2, no S3 or S4, no murmur, click or rub, no peripheral edema  ABDOMEN: soft, nontender, no hepatosplenomegaly, no masses  BACK: no CVA tenderness   MS: no gross musculoskeletal defects noted, no edema  SKIN: no suspicious lesions or  rashes  PSYCH: mentation appears normal, affect normal/bright    Admission on 06/11/2024, Discharged on 06/14/2024   Component Date Value Ref Range Status    Culture 06/11/2024 Positive on the 1st day of incubation (A)   Final    Culture 06/11/2024 Escherichia coli (AA)   Final    1 of 2 bottles    Culture 06/11/2024 Positive on the 1st day of incubation (A)   Final    Culture 06/11/2024 Escherichia coli (AA)   Final    2 of 2 bottles  Susceptibilities done on previous cultures    Lactic Acid, Initial 06/11/2024 2.5 (H)  0.7 - 2.0 mmol/L Final    Ventricular Rate 06/11/2024 129  BPM Final    Atrial Rate 06/11/2024 129  BPM Final    ME Interval 06/11/2024 128  ms Final    QRS Duration 06/11/2024 76  ms Final    QT 06/11/2024 304  ms Final    QTc 06/11/2024 445  ms Final    P Axis 06/11/2024 53  degrees Final    R AXIS 06/11/2024 95  degrees Final    T Axis 06/11/2024 42  degrees Final    Interpretation ECG 06/11/2024    Final                    Value:Sinus tachycardia with occasional Premature ventricular complexes  Rightward axis  Low voltage QRS  Borderline ECG  No previous ECGs available  Confirmed by SEE ED PROVIDER NOTE FOR, ECG INTERPRETATION ([id number]),  MANSOOR LOPEZ ([id number]) on 6/13/2024 7:17:27 PM      Color Urine 06/11/2024 Light Yellow  Colorless, Straw, Light Yellow, Yellow Final    Appearance Urine 06/11/2024 Clear  Clear Final    Glucose Urine 06/11/2024 Negative  Negative mg/dL Final    Bilirubin Urine 06/11/2024 Negative  Negative Final    Ketones Urine 06/11/2024 Negative  Negative mg/dL Final    Specific Gravity Urine 06/11/2024 1.005  1.001 - 1.030 Final    Blood Urine 06/11/2024 Negative  Negative Final    pH Urine 06/11/2024 6.0  5.0 - 7.0 Final    Protein Albumin Urine 06/11/2024 10 (A)  Negative mg/dL Final    Urobilinogen Urine 06/11/2024 <2.0  <2.0 mg/dL Final    Nitrite Urine 06/11/2024 Negative  Negative Final    Leukocyte Esterase Urine 06/11/2024 75 Reina/uL (A)  Negative Final     Bacteria Urine 06/11/2024 Few (A)  None Seen /HPF Final    RBC Urine 06/11/2024 1  <=2 /HPF Final    WBC Urine 06/11/2024 23 (H)  <=5 /HPF Final    Squamous Epithelials Urine 06/11/2024 7 (H)  <=1 /HPF Final    Sodium 06/11/2024 136  135 - 145 mmol/L Final    Reference intervals for this test were updated on 09/26/2023 to more accurately reflect our healthy population. There may be differences in the flagging of prior results with similar values performed with this method. Interpretation of those prior results can be made in the context of the updated reference intervals.     Potassium 06/11/2024 3.6  3.4 - 5.3 mmol/L Final    Chloride 06/11/2024 102  98 - 107 mmol/L Final    Carbon Dioxide (CO2) 06/11/2024 20 (L)  22 - 29 mmol/L Final    Anion Gap 06/11/2024 14  7 - 15 mmol/L Final    Urea Nitrogen 06/11/2024 8.3  6.0 - 20.0 mg/dL Final    Creatinine 06/11/2024 0.65  0.51 - 0.95 mg/dL Final    GFR Estimate 06/11/2024 >90  >60 mL/min/1.73m2 Final    Calcium 06/11/2024 9.1  8.6 - 10.0 mg/dL Final    Glucose 06/11/2024 142 (H)  70 - 99 mg/dL Final    hCG Serum Qualitative 06/11/2024 Negative  Negative Final    This test is for screening purposes.  Results should be interpreted along with the clinical picture.  Confirmation testing is available if warranted by ordering [id number], HCG Quantitative Pregnancy.    Influenza A PCR 06/11/2024 Negative  Negative Final    Influenza B PCR 06/11/2024 Negative  Negative Final    RSV PCR 06/11/2024 Negative  Negative Final    SARS CoV2 PCR 06/11/2024 Negative  Negative Final    NEGATIVE: SARS-CoV-2 (COVID-19) RNA not detected, presumed negative.    WBC Count 06/11/2024 14.9 (H)  4.0 - 11.0 10e3/uL Final    RBC Count 06/11/2024 4.22  3.80 - 5.20 10e6/uL Final    Hemoglobin 06/11/2024 12.5  11.7 - 15.7 g/dL Final    Hematocrit 06/11/2024 37.9  35.0 - 47.0 % Final    MCV 06/11/2024 90  78 - 100 fL Final    MCH 06/11/2024 29.6  26.5 - 33.0 pg Final    MCHC 06/11/2024 33.0  31.5 - 36.5  g/dL Final    RDW 06/11/2024 13.3  10.0 - 15.0 % Final    Platelet Count 06/11/2024 231  150 - 450 10e3/uL Final    % Neutrophils 06/11/2024 91  % Final    % Lymphocytes 06/11/2024 8  % Final    % Monocytes 06/11/2024 1  % Final    % Eosinophils 06/11/2024 0  % Final    % Basophils 06/11/2024 0  % Final    % Immature Granulocytes 06/11/2024 0  % Final    NRBCs per 100 WBC 06/11/2024 0  <1 /100 Final    Absolute Neutrophils 06/11/2024 13.5 (H)  1.6 - 8.3 10e3/uL Final    Absolute Lymphocytes 06/11/2024 1.1  0.8 - 5.3 10e3/uL Final    Absolute Monocytes 06/11/2024 0.2  0.0 - 1.3 10e3/uL Final    Absolute Eosinophils 06/11/2024 0.0  0.0 - 0.7 10e3/uL Final    Absolute Basophils 06/11/2024 0.0  0.0 - 0.2 10e3/uL Final    Absolute Immature Granulocytes 06/11/2024 0.0  <=0.4 10e3/uL Final    Absolute NRBCs 06/11/2024 0.0  10e3/uL Final    Lactic Acid 06/11/2024 1.2  0.7 - 2.0 mmol/L Final    Culture 06/11/2024 50,000-100,000 CFU/mL Mixture of urogenital jacqueline   Final    Sodium 06/12/2024 138  135 - 145 mmol/L Final    Reference intervals for this test were updated on 09/26/2023 to more accurately reflect our healthy population. There may be differences in the flagging of prior results with similar values performed with this method. Interpretation of those prior results can be made in the context of the updated reference intervals.     Potassium 06/12/2024 3.3 (L)  3.4 - 5.3 mmol/L Final    Chloride 06/12/2024 110 (H)  98 - 107 mmol/L Final    Carbon Dioxide (CO2) 06/12/2024 19 (L)  22 - 29 mmol/L Final    Anion Gap 06/12/2024 9  7 - 15 mmol/L Final    Urea Nitrogen 06/12/2024 8.6  6.0 - 20.0 mg/dL Final    Creatinine 06/12/2024 0.54  0.51 - 0.95 mg/dL Final    GFR Estimate 06/12/2024 >90  >60 mL/min/1.73m2 Final    Calcium 06/12/2024 8.7  8.6 - 10.0 mg/dL Final    Glucose 06/12/2024 119 (H)  70 - 99 mg/dL Final    WBC Count 06/12/2024 18.7 (H)  4.0 - 11.0 10e3/uL Final    RBC Count 06/12/2024 3.57 (L)  3.80 - 5.20 10e6/uL  Final    Hemoglobin 06/12/2024 10.5 (L)  11.7 - 15.7 g/dL Final    Hematocrit 06/12/2024 32.2 (L)  35.0 - 47.0 % Final    MCV 06/12/2024 90  78 - 100 fL Final    MCH 06/12/2024 29.4  26.5 - 33.0 pg Final    MCHC 06/12/2024 32.6  31.5 - 36.5 g/dL Final    RDW 06/12/2024 13.7  10.0 - 15.0 % Final    Platelet Count 06/12/2024 173  150 - 450 10e3/uL Final    Acinetobacter species 06/11/2024 Not Detected  Not Detected Final    Citrobacter species 06/11/2024 Not Detected  Not Detected Final    Enterobacter species 06/11/2024 Not Detected  Not Detected Final    Proteus species 06/11/2024 Not Detected  Not Detected Final    Escherichia coli 06/11/2024 Detected (A)  Not Detected Final    Positive for Escherichia coli by Verigene multiplex nucleic acid test. Final identification and antimicrobial susceptibility testing will be verified by standard methods. Verigene test will not distinguish E. coli from Shigella species including Shigella dysenteriae, Shigella flexneri, Shigella boydii, and Shigella sonnei. Specimens containing Shigella species or E. coli will be reported as positive for E. coli.    Klebsiella pneumoniae 06/11/2024 Not Detected  Not Detected Final    Klebsiella oxytoca 06/11/2024 Not Detected  Not Detected Final    Pseudomonas aeruginosa 06/11/2024 Not Detected  Not Detected Final    CTX-M 06/11/2024 Not Detected  Not Detected, NA Final    KPC 06/11/2024 Not Detected  Not Detected, NA Final    NDM 06/11/2024 Not Detected  Not Detected, NA Final    VIM 06/11/2024 Not Detected  Not Detected, NA Final    IMP 06/11/2024 Not Detected  Not Detected, NA Final    OXA 06/11/2024 Not Detected  Not Detected, NA Final    Potassium 06/12/2024 3.9  3.4 - 5.3 mmol/L Final    WBC Count 06/13/2024 15.7 (H)  4.0 - 11.0 10e3/uL Final    RBC Count 06/13/2024 3.67 (L)  3.80 - 5.20 10e6/uL Final    Hemoglobin 06/13/2024 10.6 (L)  11.7 - 15.7 g/dL Final    Hematocrit 06/13/2024 33.1 (L)  35.0 - 47.0 % Final    MCV 06/13/2024 90  78  - 100 fL Final    MCH 06/13/2024 28.9  26.5 - 33.0 pg Final    MCHC 06/13/2024 32.0  31.5 - 36.5 g/dL Final    RDW 06/13/2024 13.5  10.0 - 15.0 % Final    Platelet Count 06/13/2024 193  150 - 450 10e3/uL Final    Sodium 06/13/2024 137  135 - 145 mmol/L Final    Reference intervals for this test were updated on 09/26/2023 to more accurately reflect our healthy population. There may be differences in the flagging of prior results with similar values performed with this method. Interpretation of those prior results can be made in the context of the updated reference intervals.     Potassium 06/13/2024 3.9  3.4 - 5.3 mmol/L Final    Chloride 06/13/2024 107  98 - 107 mmol/L Final    Carbon Dioxide (CO2) 06/13/2024 19 (L)  22 - 29 mmol/L Final    Anion Gap 06/13/2024 11  7 - 15 mmol/L Final    Urea Nitrogen 06/13/2024 3.9 (L)  6.0 - 20.0 mg/dL Final    Creatinine 06/13/2024 0.61  0.51 - 0.95 mg/dL Final    GFR Estimate 06/13/2024 >90  >60 mL/min/1.73m2 Final    Calcium 06/13/2024 8.8  8.6 - 10.0 mg/dL Final    Glucose 06/13/2024 103 (H)  70 - 99 mg/dL Final    Lactic Acid, Initial 06/13/2024 0.7  0.7 - 2.0 mmol/L Final    WBC Count 06/14/2024 8.5  4.0 - 11.0 10e3/uL Final    RBC Count 06/14/2024 3.90  3.80 - 5.20 10e6/uL Final    Hemoglobin 06/14/2024 11.3 (L)  11.7 - 15.7 g/dL Final    Hematocrit 06/14/2024 35.5  35.0 - 47.0 % Final    MCV 06/14/2024 91  78 - 100 fL Final    MCH 06/14/2024 29.0  26.5 - 33.0 pg Final    MCHC 06/14/2024 31.8  31.5 - 36.5 g/dL Final    RDW 06/14/2024 13.6  10.0 - 15.0 % Final    Platelet Count 06/14/2024 224  150 - 450 10e3/uL Final    Sodium 06/14/2024 140  135 - 145 mmol/L Final    Reference intervals for this test were updated on 09/26/2023 to more accurately reflect our healthy population. There may be differences in the flagging of prior results with similar values performed with this method. Interpretation of those prior results can be made in the context of the updated reference  intervals.     Potassium 06/14/2024 3.9  3.4 - 5.3 mmol/L Final    Chloride 06/14/2024 106  98 - 107 mmol/L Final    Carbon Dioxide (CO2) 06/14/2024 21 (L)  22 - 29 mmol/L Final    Anion Gap 06/14/2024 13  7 - 15 mmol/L Final    Urea Nitrogen 06/14/2024 5.7 (L)  6.0 - 20.0 mg/dL Final    Creatinine 06/14/2024 0.62  0.51 - 0.95 mg/dL Final    GFR Estimate 06/14/2024 >90  >60 mL/min/1.73m2 Final    eGFR calculated using 2021 CKD-EPI equation.    Calcium 06/14/2024 9.1  8.6 - 10.0 mg/dL Final    Glucose 06/14/2024 94  70 - 99 mg/dL Final           Signed Electronically by: Mary Powers PA-C

## 2024-10-05 ENCOUNTER — HEALTH MAINTENANCE LETTER (OUTPATIENT)
Age: 29
End: 2024-10-05

## 2025-04-22 NOTE — PROGRESS NOTES
[facility] MEDICINE PROGRESS NOTE      Identification/Summary: Bryson Cornelius is a 29 year old female with no significant past medical history who was admitted on 6/11/2024 for pyelonephritis and sepsis. Started on Zosyn and received a dose of vancomycin in the ED.  Her blood cultures came back positive for E. coli.  Urine culture pending.    Assessment and Plan:  Pyelonephritis  Sepsis  E. coli bacteremia  Tmax 104.5 improving, leukocytosis 14.9-18.7  Lactic acidosis improving with fluids  UA positive for proteinuria, pyuria, and leukocyte esterase  CT positive for right sided renal edema, uretal wall thickening, and perinephric stranding  Blood cultures positive for gram negative bacilli, E.coli  Blood pressure stable  Continue IVF  Continue Zosyn  Continue to monitor vital signs and leukocytosis    Hypokalemia  Replace per protocol    Mild normocytic anemia  No sign of bleeding.  Monitor.  Likely related to IV fluids    Clinically Significant Risk Factors Present on Admission        # Hypokalemia: Lowest K = 3.3 mmol/L in last 2 days, will replace as needed              # Anemia: based on hgb <11           # Overweight: Estimated body mass index is 29.29 kg/m  as calculated from the following:    Height as of this encounter: 1.524 m (5').    Weight as of this encounter: 68 kg (150 lb).              Diet: Combination Diet Regular Diet Adult  Rodriguez Catheter: Not present  Lines: None       DVT Prophylaxis:  Low Risk/Ambulatory with no VTE prophylaxis indicated  Code Status: Full Code    Anticipated possible discharge in more than 2 days  Disposition Plan      Expected Discharge Date: 06/13/2024                The patient's care was discussed with the Attending Physician, Dr. Webster .    Shaquille Dobbs  PA-S  [facility] Medicine  [facility]  Phone: #[phone]  Securely message with the Vocera Web Console (learn more here)  Text page via [employer] Paging/Directory      Interval History/Subjective:  Feels she is doing better than when she first presented. Still experiencing right sided abdominal/flank pain but is well-managed with Tylenol. Denies fevers/chills, chest pain, dyspnea, nausea, urine or bowel changes. Reports no history of UTIs but is sexually active.    Physical Exam/Objective:  Temp:  [98.2  F (36.8  C)-104.5  F (40.3  C)] 98.2  F (36.8  C)  Pulse:  [] 79  Resp:  [18-36] 34  BP: ()/(55-71) 99/63  SpO2:  [96 %-98 %] 98 %  Body mass index is 29.29 kg/m .  General: Alert and oriented, well developed well nourished female who appears stated age and is resting comfortably in bed  Eyes: No erythema, edema, or icterus  Cardiac: RRR, no murmurs or gallops noted with auscultation, extremities well-perfused  Respiratory: Non-labored breathing, no cyanosis noted  Abdominal: Non-distended abdomen. Tenderness noted in RUQ, CVA tenderness on right side  Musculoskeletal: Moves all limbs spontaneously, no abnormalities noted  Skin: No erythema, jaundice, or rash noted on exposed skin   Neurologic: Answers all questions appropriately      Data reviewed today: I personally reviewed all new medications, labs, imaging/diagnostics reports over the past 24 hours. Pertinent findings include:    Imaging:   Recent Results (from the past 24 hour(s))   Abd/pelvis CT no contrast - Stone Protocol    Narrative    EXAM: CT ABDOMEN PELVIS W/O CONTRAST  LOCATION: [facility]  DATE: 6/11/2024    INDICATION: Right lower abdominal pain. Fever and chills.  COMPARISON: None.  TECHNIQUE: CT scan of the abdomen and pelvis was performed without IV contrast. Multiplanar reformats were obtained. Dose reduction techniques were used.  CONTRAST: None.    FINDINGS:   LOWER CHEST: Normal.    HEPATOBILIARY: Normal.    PANCREAS: Normal.    SPLEEN: Normal.    ADRENAL GLANDS: Normal.    KIDNEYS/BLADDER: Asymmetric right renal edema and perinephric stranding. Mild right ureteral  wall thickening. Grossly unremarkable left kidney. Mildly distended bladder.    BOWEL: Unremarkable bowel and appendix.    LYMPH NODES: No adenopathy.    VASCULATURE: Nonaneurysmal aorta.    PELVIC ORGANS: Unremarkable.    MUSCULOSKELETAL: Unremarkable.    OTHER: No significant free fluid or air.       Impression    IMPRESSION:     1.  Asymmetric right renal edema and right ureteral wall thickening. Given history of chills and fever, findings suggest ascending urinary tract infection and pyelonephritis. Differential would include a recently passed collecting system stone.    2.  No hydronephrosis.     XR Chest 2 Views    Narrative    EXAM: XR CHEST 2 VIEWS  LOCATION: [facility]  DATE: 6/11/2024    INDICATION: fever  COMPARISON: None.      Impression    IMPRESSION:   No acute abnormality.    No focal pulmonary infiltrate, pleural effusion, or pneumothorax. The cardiac size and mediastinal contours appear within normal limits.        Labs:  Most Recent 3 CBC's:  Recent Labs   Lab Test 06/12/24  0534 06/11/24  1602 10/09/23  0949   WBC 18.7* 14.9* 12.2*   HGB 10.5* 12.5 12.0   MCV 90 90 83    231 227     Most Recent 3 BMP's:  Recent Labs   Lab Test 06/12/24  0534 06/11/24  1602    136   POTASSIUM 3.3* 3.6   CHLORIDE 110* 102   CO2 19* 20*   BUN 8.6 8.3   CR 0.54 0.65   ANIONGAP 9 14   ELLE 8.7 9.1   * 142*     Most Recent Urinalysis:  Recent Labs   Lab Test 06/11/24  1644   COLOR Light Yellow   APPEARANCE Clear   URINEGLC Negative   URINEBILI Negative   URINEKETONE Negative   SG 1.005   UBLD Negative   URINEPH 6.0   PROTEIN 10*   NITRITE Negative   LEUKEST 75 Reina/uL*   RBCU 1   WBCU 23*       Medications:   Personally Reviewed.  Medications   Current Facility-Administered Medications   Medication Dose Route Frequency Provider Last Rate Last Admin    sodium chloride 0.9 % infusion   Intravenous Continuous Evonne Webster MD 50 mL/hr at 06/12/24 0823 Rate Change at 06/12/24  0823     Current Facility-Administered Medications   Medication Dose Route Frequency Provider Last Rate Last Admin    piperacillin-tazobactam (ZOSYN) 3.375 g vial to attach to  mL bag  3.375 g Intravenous Q8H Evonne Webster MD   3.375 g at 06/12/24 0843    sodium chloride (PF) 0.9% PF flush 3 mL  3 mL Intracatheter Q8H Evonne Webster MD   3 mL at 06/11/24 2046      45 minutes spent in evaluating this patient including history physical exam, care coordination.    I agree with the documentation and findings as written by Shaquille MOJICA and our discussed and formulated assessment and plan. I was present with him who participated in the service and documentation of the note. I have also personally verified the history and personally performed the physical exam and medical decision-making. I agree with the assessment and plan of care as documented in the note.    Evonne Webster MD  Internal Medicine  Hospitalist  [facility]  Phone: #[phone]      Show Applicator Variable?: Yes Render Note In Bullet Format When Appropriate: No Consent: The patient's consent was obtained including but not limited to risks of crusting, scabbing, blistering, scarring, darker or lighter pigmentary change, recurrence, incomplete removal and infection. Detail Level: Detailed Post-Care Instructions: I reviewed with the patient in detail post-care instructions. Patient is to wear sunprotection, and avoid picking at any of the treated lesions. Pt may apply Vaseline to crusted or scabbing areas.